# Patient Record
Sex: MALE | Race: WHITE | Employment: FULL TIME | URBAN - METROPOLITAN AREA
[De-identification: names, ages, dates, MRNs, and addresses within clinical notes are randomized per-mention and may not be internally consistent; named-entity substitution may affect disease eponyms.]

---

## 2018-04-10 PROBLEM — M20.42 HAMMERTOES OF BOTH FEET: Status: ACTIVE | Noted: 2018-04-10

## 2018-04-10 PROBLEM — E11.69 DIABETES MELLITUS TYPE 2 IN OBESE (HCC): Status: ACTIVE | Noted: 2018-04-10

## 2018-04-10 PROBLEM — E66.9 DIABETES MELLITUS TYPE 2 IN OBESE (HCC): Status: ACTIVE | Noted: 2018-04-10

## 2018-04-10 PROBLEM — M20.41 HAMMERTOES OF BOTH FEET: Status: ACTIVE | Noted: 2018-04-10

## 2018-10-18 ENCOUNTER — OFFICE VISIT (OUTPATIENT)
Dept: PODIATRY | Age: 63
End: 2018-10-18
Payer: COMMERCIAL

## 2018-10-18 VITALS — BODY MASS INDEX: 31.15 KG/M2 | HEIGHT: 72 IN | WEIGHT: 230 LBS

## 2018-10-18 DIAGNOSIS — M20.41 HAMMERTOES OF BOTH FEET: Primary | ICD-10-CM

## 2018-10-18 DIAGNOSIS — M20.42 HAMMERTOES OF BOTH FEET: Primary | ICD-10-CM

## 2018-10-18 DIAGNOSIS — E11.9 CONTROLLED TYPE 2 DIABETES MELLITUS WITHOUT COMPLICATION, WITHOUT LONG-TERM CURRENT USE OF INSULIN (HCC): ICD-10-CM

## 2018-10-18 PROCEDURE — 99203 OFFICE O/P NEW LOW 30 MIN: CPT | Performed by: PODIATRIST

## 2018-10-18 ASSESSMENT — ENCOUNTER SYMPTOMS
NAUSEA: 0
DIARRHEA: 0
COLOR CHANGE: 0
VOMITING: 0
CONSTIPATION: 0

## 2019-01-10 ENCOUNTER — OFFICE VISIT (OUTPATIENT)
Dept: PRIMARY CARE CLINIC | Age: 64
End: 2019-01-10
Payer: COMMERCIAL

## 2019-01-10 VITALS
OXYGEN SATURATION: 98 % | WEIGHT: 236.4 LBS | BODY MASS INDEX: 32.02 KG/M2 | DIASTOLIC BLOOD PRESSURE: 80 MMHG | HEART RATE: 63 BPM | SYSTOLIC BLOOD PRESSURE: 136 MMHG | HEIGHT: 72 IN

## 2019-01-10 DIAGNOSIS — E11.69 DIABETES MELLITUS TYPE 2 IN OBESE (HCC): Primary | ICD-10-CM

## 2019-01-10 DIAGNOSIS — E11.9 TYPE 2 DIABETES MELLITUS WITHOUT COMPLICATION, WITHOUT LONG-TERM CURRENT USE OF INSULIN (HCC): ICD-10-CM

## 2019-01-10 DIAGNOSIS — E66.9 DIABETES MELLITUS TYPE 2 IN OBESE (HCC): Primary | ICD-10-CM

## 2019-01-10 DIAGNOSIS — E78.00 PURE HYPERCHOLESTEROLEMIA: ICD-10-CM

## 2019-01-10 DIAGNOSIS — I10 ESSENTIAL (PRIMARY) HYPERTENSION: ICD-10-CM

## 2019-01-10 DIAGNOSIS — I10 ESSENTIAL HYPERTENSION: ICD-10-CM

## 2019-01-10 DIAGNOSIS — R09.81 SINUS CONGESTION: ICD-10-CM

## 2019-01-10 LAB — HBA1C MFR BLD: 8.4 %

## 2019-01-10 PROCEDURE — 99213 OFFICE O/P EST LOW 20 MIN: CPT | Performed by: FAMILY MEDICINE

## 2019-01-10 PROCEDURE — 83036 HEMOGLOBIN GLYCOSYLATED A1C: CPT | Performed by: FAMILY MEDICINE

## 2019-01-10 RX ORDER — ENALAPRIL MALEATE 10 MG/1
10 TABLET ORAL DAILY
Qty: 90 TABLET | Refills: 3 | Status: SHIPPED | OUTPATIENT
Start: 2019-01-10 | End: 2019-04-10

## 2019-01-10 RX ORDER — GLIMEPIRIDE 4 MG/1
4 TABLET ORAL 2 TIMES DAILY
Qty: 180 TABLET | Refills: 3 | Status: SHIPPED | OUTPATIENT
Start: 2019-01-10 | End: 2019-04-10

## 2019-01-10 RX ORDER — PRAVASTATIN SODIUM 40 MG
40 TABLET ORAL DAILY
Qty: 90 TABLET | Refills: 3 | Status: SHIPPED | OUTPATIENT
Start: 2019-01-10 | End: 2019-04-10

## 2019-01-10 RX ORDER — METOPROLOL SUCCINATE 25 MG/1
25 TABLET, EXTENDED RELEASE ORAL DAILY
Qty: 90 TABLET | Refills: 3 | Status: SHIPPED | OUTPATIENT
Start: 2019-01-10 | End: 2019-04-10

## 2019-01-10 RX ORDER — FENOFIBRATE 134 MG/1
134 CAPSULE ORAL
Qty: 90 CAPSULE | Refills: 3 | Status: SHIPPED | OUTPATIENT
Start: 2019-01-10 | End: 2019-04-10

## 2019-01-10 ASSESSMENT — ENCOUNTER SYMPTOMS
VOMITING: 0
WHEEZING: 0
RHINORRHEA: 0
NAUSEA: 0
COUGH: 0
SHORTNESS OF BREATH: 0
SORE THROAT: 0
ABDOMINAL PAIN: 0
DIARRHEA: 0
EYE DISCHARGE: 0
EYE REDNESS: 0

## 2019-01-10 ASSESSMENT — PATIENT HEALTH QUESTIONNAIRE - PHQ9
SUM OF ALL RESPONSES TO PHQ QUESTIONS 1-9: 0
SUM OF ALL RESPONSES TO PHQ9 QUESTIONS 1 & 2: 0
1. LITTLE INTEREST OR PLEASURE IN DOING THINGS: 0
SUM OF ALL RESPONSES TO PHQ QUESTIONS 1-9: 0
2. FEELING DOWN, DEPRESSED OR HOPELESS: 0

## 2019-04-10 ENCOUNTER — OFFICE VISIT (OUTPATIENT)
Dept: PRIMARY CARE CLINIC | Age: 64
End: 2019-04-10
Payer: COMMERCIAL

## 2019-04-10 VITALS
OXYGEN SATURATION: 97 % | WEIGHT: 231.4 LBS | DIASTOLIC BLOOD PRESSURE: 70 MMHG | HEIGHT: 72 IN | SYSTOLIC BLOOD PRESSURE: 132 MMHG | HEART RATE: 63 BPM | BODY MASS INDEX: 31.34 KG/M2

## 2019-04-10 DIAGNOSIS — E11.9 TYPE 2 DIABETES MELLITUS WITHOUT COMPLICATION, WITHOUT LONG-TERM CURRENT USE OF INSULIN (HCC): ICD-10-CM

## 2019-04-10 DIAGNOSIS — I10 ESSENTIAL HYPERTENSION: ICD-10-CM

## 2019-04-10 DIAGNOSIS — E11.69 DIABETES MELLITUS TYPE 2 IN OBESE (HCC): Primary | ICD-10-CM

## 2019-04-10 DIAGNOSIS — I10 ESSENTIAL (PRIMARY) HYPERTENSION: ICD-10-CM

## 2019-04-10 DIAGNOSIS — R09.81 SINUS CONGESTION: ICD-10-CM

## 2019-04-10 DIAGNOSIS — E66.9 DIABETES MELLITUS TYPE 2 IN OBESE (HCC): Primary | ICD-10-CM

## 2019-04-10 LAB — HBA1C MFR BLD: 7.2 %

## 2019-04-10 PROCEDURE — 99213 OFFICE O/P EST LOW 20 MIN: CPT | Performed by: FAMILY MEDICINE

## 2019-04-10 PROCEDURE — 83036 HEMOGLOBIN GLYCOSYLATED A1C: CPT | Performed by: FAMILY MEDICINE

## 2019-04-10 RX ORDER — METOPROLOL SUCCINATE 25 MG/1
25 TABLET, EXTENDED RELEASE ORAL DAILY
Qty: 90 TABLET | Refills: 3 | Status: SHIPPED | OUTPATIENT
Start: 2019-04-10 | End: 2019-07-15

## 2019-04-10 RX ORDER — FENOFIBRATE 134 MG/1
134 CAPSULE ORAL
Qty: 90 CAPSULE | Refills: 3 | Status: SHIPPED | OUTPATIENT
Start: 2019-04-10 | End: 2019-07-15

## 2019-04-10 RX ORDER — GLIMEPIRIDE 4 MG/1
4 TABLET ORAL 2 TIMES DAILY
Qty: 180 TABLET | Refills: 3 | Status: SHIPPED | OUTPATIENT
Start: 2019-04-10 | End: 2019-05-20 | Stop reason: SDUPTHER

## 2019-04-10 RX ORDER — ENALAPRIL MALEATE 10 MG/1
10 TABLET ORAL DAILY
Qty: 90 TABLET | Refills: 3 | Status: SHIPPED | OUTPATIENT
Start: 2019-04-10 | End: 2019-07-15

## 2019-04-10 RX ORDER — PRAVASTATIN SODIUM 40 MG
40 TABLET ORAL DAILY
Qty: 90 TABLET | Refills: 3 | Status: SHIPPED | OUTPATIENT
Start: 2019-04-10 | End: 2019-07-15

## 2019-04-10 ASSESSMENT — ENCOUNTER SYMPTOMS
ABDOMINAL PAIN: 0
EYE DISCHARGE: 0
SORE THROAT: 0
RHINORRHEA: 0
SHORTNESS OF BREATH: 0
DIARRHEA: 0
COUGH: 0
WHEEZING: 0
NAUSEA: 0
VOMITING: 0
EYE REDNESS: 0

## 2019-04-10 NOTE — PROGRESS NOTES
[address] [place] [address] PRIMARY CARE  [address] [place] [address]  Dept: [phone]    Sunni Driscoll is a 58 y.o. male who presents today for his medical conditions/complaintsas noted below. Chief Complaint   Patient presents with    3 Month Follow-Up     DM check       HPI:     HPI   Pt presents today for a 3 month follow-up for his DM. The pt states he doesn't check his sugars often at home but he says when he does they range from 110-130. Pt states he does not check his blood pressure often at home either. LDL Calculated (mg/dL)   Date Value   10/05/2018 99   04/03/2017 109   01/07/2016 103       (goal LDL is <100)   AST (U/L)   Date Value   01/07/2016 44     ALT (U/L)   Date Value   01/07/2016 50     BP Readings from Last 3 Encounters:   04/10/19 132/70   01/10/19 136/80   10/09/18 126/70          (goal 120/80)    No past medical history on file. Past Surgical History:   Procedure Laterality Date    JOINT REPLACEMENT      SHOULDER SURGERY Right        Family History   Problem Relation Age of Onset    Dementia Mother     Diabetes Mother     Lung Cancer Brother        Social History     Tobacco Use    Smoking status: Never Smoker    Smokeless tobacco: Never Used   Substance Use Topics    Alcohol use:  Yes     Alcohol/week: 0.0 oz     Comment: very rarely      Current Outpatient Medications   Medication Sig Dispense Refill    glimepiride (AMARYL) 4 MG tablet Take 1 tablet by mouth 2 times daily 180 tablet 3    fenofibrate micronized (LOFIBRA) 134 MG capsule Take 1 capsule by mouth every morning (before breakfast) 90 capsule 3    empagliflozin (JARDIANCE) 10 MG tablet Take 1 tablet by mouth daily 90 tablet 3    metFORMIN (GLUCOPHAGE) 1000 MG tablet Take 1 tablet by mouth 2 times daily (with meals) 180 tablet 3    metoprolol succinate (TOPROL XL) 25 MG extended release tablet Take 1 tablet by mouth daily 90 tablet 3    pravastatin (PRAVACHOL) 40 MG tablet Take 1 tablet by mouth daily 90 tablet 3    SITagliptin (JANUVIA) 50 MG tablet Take 0.5 tablets by mouth daily 90 tablet 3    enalapril (VASOTEC) 10 MG tablet Take 1 tablet by mouth daily 90 tablet 3    aspirin EC 81 MG EC tablet Take 1 tablet by mouth daily 30 tablet 3     No current facility-administered medications for this visit. No Known Allergies    Health Maintenance   Topic Date Due    Diabetic retinal exam  08/14/1964    HIV screen  08/14/1969    Colon cancer screen colonoscopy  08/14/2004    Shingles Vaccine (2 of 3) 06/01/2017    Lipid screen  10/05/2019    Potassium monitoring  10/05/2019    Creatinine monitoring  10/05/2019    Diabetic microalbuminuria test  10/09/2019    Diabetic foot exam  10/18/2019    A1C test (Diabetic or Prediabetic)  01/10/2020    DTaP/Tdap/Td vaccine (2 - Td) 01/01/2022    Flu vaccine  Completed    Pneumococcal 0-64 years Vaccine  Completed    Hepatitis C screen  Completed       Subjective:      Review of Systems   Constitutional: Negative for chills and fever. HENT: Negative for rhinorrhea and sore throat. Eyes: Negative for discharge and redness. Respiratory: Negative for cough, shortness of breath and wheezing. Cardiovascular: Negative for chest pain and palpitations. Gastrointestinal: Negative for abdominal pain, diarrhea, nausea and vomiting. Genitourinary: Negative for dysuria and frequency. Musculoskeletal: Negative for arthralgias and myalgias. Neurological: Negative for dizziness, light-headedness and headaches. Psychiatric/Behavioral: Negative for sleep disturbance. Objective:     /70   Pulse 63   Ht 6' (1.829 m)   Wt 231 lb 6.4 oz (105 kg)   SpO2 97%   BMI 31.38 kg/m²   Physical Exam   Constitutional: He is oriented to person, place, and time. He appears well-developed and well-nourished. No distress. HENT:   Head: Normocephalic and atraumatic.    Mouth/Throat: Oropharynx is clear and moist.   Eyes: Pupils are equal, round, and reactive to light. Conjunctivae are normal. Right eye exhibits no discharge. Left eye exhibits no discharge. No scleral icterus. Neck: No tracheal deviation present. No thyromegaly present. Cardiovascular: Normal rate, regular rhythm and normal heart sounds. No carotid bruits   Pulmonary/Chest: Effort normal and breath sounds normal. No respiratory distress. He has no wheezes. Abdominal: He exhibits no distension. There is no tenderness. Musculoskeletal: He exhibits no edema. Lymphadenopathy:     He has no cervical adenopathy. Neurological: He is alert and oriented to person, place, and time. Skin: Skin is warm. No rash noted. Psychiatric: He has a normal mood and affect. His behavior is normal. Thought content normal.   Nursing note and vitals reviewed. Assessment:       Diagnosis Orders   1. Diabetes mellitus type 2 in obese ([facility])  MT COLLECTION CAPILLARY BLOOD SPECIMEN    POCT glycosylated hemoglobin (Hb A1C)    Basic Metabolic Panel, Fasting    empagliflozin (JARDIANCE) 10 MG tablet   2. Essential hypertension  Basic Metabolic Panel, Fasting   3. Type 2 diabetes mellitus without complication, without long-term current use of insulin ([facility])  glimepiride (AMARYL) 4 MG tablet    metFORMIN (GLUCOPHAGE) 1000 MG tablet    pravastatin (PRAVACHOL) 40 MG tablet    SITagliptin (JANUVIA) 50 MG tablet    enalapril (VASOTEC) 10 MG tablet   4. Sinus congestion  fenofibrate micronized (LOFIBRA) 134 MG capsule   5. Essential (primary) hypertension  metoprolol succinate (TOPROL XL) 25 MG extended release tablet        Plan:    labs ordered  Increase Januvia to 50mg daily  Renew meds  Colonoscopy with Dr Aleida Willard once on Medicare  Pt to get eye exam done     Return in about 3 months (around 7/10/2019).     Orders Placed This Encounter   Procedures    Basic Metabolic Panel, Fasting     Standing Status:   Future     Standing Expiration Date:   4/10/2020    POCT glycosylated hemoglobin (Hb A1C)    [place] COLLECTION CAPILLARY BLOOD SPECIMEN     Orders Placed This Encounter   Medications    glimepiride (AMARYL) 4 MG tablet     Sig: Take 1 tablet by mouth 2 times daily     Dispense:  180 tablet     Refill:  3    fenofibrate micronized (LOFIBRA) 134 MG capsule     Sig: Take 1 capsule by mouth every morning (before breakfast)     Dispense:  90 capsule     Refill:  3    empagliflozin (JARDIANCE) 10 MG tablet     Sig: Take 1 tablet by mouth daily     Dispense:  90 tablet     Refill:  3    metFORMIN (GLUCOPHAGE) 1000 MG tablet     Sig: Take 1 tablet by mouth 2 times daily (with meals)     Dispense:  180 tablet     Refill:  3    metoprolol succinate (TOPROL XL) 25 MG extended release tablet     Sig: Take 1 tablet by mouth daily     Dispense:  90 tablet     Refill:  3    pravastatin (PRAVACHOL) 40 MG tablet     Sig: Take 1 tablet by mouth daily     Dispense:  90 tablet     Refill:  3    SITagliptin (JANUVIA) 50 MG tablet     Sig: Take 0.5 tablets by mouth daily     Dispense:  90 tablet     Refill:  3    enalapril (VASOTEC) 10 MG tablet     Sig: Take 1 tablet by mouth daily     Dispense:  90 tablet     Refill:  3       Patient given educationalmaterials - see patient instructions. Discussed use, benefit, and side effectsof prescribed medications. All patient questions answered. Pt voiced understanding. Reviewed health maintenance. Instructed to continue current medications, diet andexercise. Patient agreed with treatment plan. Follow up as directed.      Electronicallysigned by Saundra Montes MD on 4/10/2019 at 9:23 AM

## 2019-05-20 ENCOUNTER — HOSPITAL ENCOUNTER (OUTPATIENT)
Age: 64
Setting detail: SPECIMEN
Discharge: HOME | End: 2019-05-20
Payer: COMMERCIAL

## 2019-05-20 DIAGNOSIS — E11.69 DIABETES MELLITUS TYPE 2 IN OBESE (HCC): ICD-10-CM

## 2019-05-20 DIAGNOSIS — E66.9 DIABETES MELLITUS TYPE 2 IN OBESE (HCC): ICD-10-CM

## 2019-05-20 DIAGNOSIS — I10 ESSENTIAL HYPERTENSION: ICD-10-CM

## 2019-05-20 DIAGNOSIS — E11.9 TYPE 2 DIABETES MELLITUS WITHOUT COMPLICATION, WITHOUT LONG-TERM CURRENT USE OF INSULIN (HCC): ICD-10-CM

## 2019-05-20 LAB
ANION GAP SERPL CALCULATED.3IONS-SCNC: 14 MMOL/L (ref 9–17)
BUN BLDV-MCNC: 29 MG/DL (ref 8–23)
BUN/CREAT BLD: ABNORMAL (ref 9–20)
CALCIUM SERPL-MCNC: 9.8 MG/DL (ref 8.6–10.4)
CHLORIDE BLD-SCNC: 102 MMOL/L (ref 98–107)
CO2: 22 MMOL/L (ref 20–31)
CREAT SERPL-MCNC: 1.16 MG/DL (ref 0.7–1.2)
GFR AFRICAN AMERICAN: >60 ML/MIN
GFR NON-AFRICAN AMERICAN: >60 ML/MIN
GFR SERPL CREATININE-BSD FRML MDRD: ABNORMAL ML/MIN/{1.73_M2}
GFR SERPL CREATININE-BSD FRML MDRD: ABNORMAL ML/MIN/{1.73_M2}
GLUCOSE FASTING: 96 MG/DL (ref 70–99)
POTASSIUM SERPL-SCNC: 4.4 MMOL/L (ref 3.7–5.3)
SODIUM BLD-SCNC: 138 MMOL/L (ref 135–144)

## 2019-05-20 RX ORDER — GLIMEPIRIDE 4 MG/1
TABLET ORAL
Qty: 30 TABLET | Refills: 11 | Status: SHIPPED | OUTPATIENT
Start: 2019-05-20 | End: 2019-07-15

## 2019-07-15 ENCOUNTER — OFFICE VISIT (OUTPATIENT)
Dept: PRIMARY CARE CLINIC | Age: 64
End: 2019-07-15
Payer: COMMERCIAL

## 2019-07-15 VITALS
BODY MASS INDEX: 31.72 KG/M2 | HEIGHT: 72 IN | DIASTOLIC BLOOD PRESSURE: 76 MMHG | SYSTOLIC BLOOD PRESSURE: 136 MMHG | OXYGEN SATURATION: 97 % | WEIGHT: 234.2 LBS | HEART RATE: 59 BPM

## 2019-07-15 DIAGNOSIS — E11.69 DIABETES MELLITUS TYPE 2 IN OBESE (HCC): ICD-10-CM

## 2019-07-15 DIAGNOSIS — E66.9 DIABETES MELLITUS TYPE 2 IN OBESE (HCC): ICD-10-CM

## 2019-07-15 DIAGNOSIS — E11.9 TYPE 2 DIABETES MELLITUS WITHOUT COMPLICATION, WITHOUT LONG-TERM CURRENT USE OF INSULIN (HCC): ICD-10-CM

## 2019-07-15 DIAGNOSIS — R09.81 SINUS CONGESTION: ICD-10-CM

## 2019-07-15 DIAGNOSIS — I10 ESSENTIAL (PRIMARY) HYPERTENSION: ICD-10-CM

## 2019-07-15 PROCEDURE — 99213 OFFICE O/P EST LOW 20 MIN: CPT | Performed by: FAMILY MEDICINE

## 2019-07-15 RX ORDER — FENOFIBRATE 134 MG/1
134 CAPSULE ORAL
Qty: 90 CAPSULE | Refills: 3 | Status: SHIPPED | OUTPATIENT
Start: 2019-07-15 | End: 2019-07-22 | Stop reason: SDUPTHER

## 2019-07-15 RX ORDER — GLIMEPIRIDE 4 MG/1
TABLET ORAL
Qty: 90 TABLET | Refills: 3 | Status: SHIPPED | OUTPATIENT
Start: 2019-07-15 | End: 2019-07-22 | Stop reason: SDUPTHER

## 2019-07-15 RX ORDER — PRAVASTATIN SODIUM 40 MG
40 TABLET ORAL DAILY
Qty: 90 TABLET | Refills: 3 | Status: SHIPPED | OUTPATIENT
Start: 2019-07-15 | End: 2019-07-22 | Stop reason: SDUPTHER

## 2019-07-15 RX ORDER — METOPROLOL SUCCINATE 25 MG/1
25 TABLET, EXTENDED RELEASE ORAL DAILY
Qty: 90 TABLET | Refills: 3 | Status: SHIPPED | OUTPATIENT
Start: 2019-07-15 | End: 2019-07-22 | Stop reason: SDUPTHER

## 2019-07-15 RX ORDER — ENALAPRIL MALEATE 10 MG/1
10 TABLET ORAL DAILY
Qty: 90 TABLET | Refills: 3 | Status: SHIPPED | OUTPATIENT
Start: 2019-07-15 | End: 2019-07-22 | Stop reason: SDUPTHER

## 2019-07-15 ASSESSMENT — ENCOUNTER SYMPTOMS
EYE REDNESS: 0
WHEEZING: 0
DIARRHEA: 0
SORE THROAT: 0
EYE DISCHARGE: 0
ABDOMINAL PAIN: 0
NAUSEA: 0
RHINORRHEA: 0
SHORTNESS OF BREATH: 0
VOMITING: 0
COUGH: 0

## 2019-07-22 DIAGNOSIS — E11.69 DIABETES MELLITUS TYPE 2 IN OBESE (HCC): ICD-10-CM

## 2019-07-22 DIAGNOSIS — E66.9 DIABETES MELLITUS TYPE 2 IN OBESE (HCC): ICD-10-CM

## 2019-07-22 DIAGNOSIS — E11.9 TYPE 2 DIABETES MELLITUS WITHOUT COMPLICATION, WITHOUT LONG-TERM CURRENT USE OF INSULIN (HCC): ICD-10-CM

## 2019-07-22 DIAGNOSIS — R09.81 SINUS CONGESTION: ICD-10-CM

## 2019-07-22 DIAGNOSIS — I10 ESSENTIAL (PRIMARY) HYPERTENSION: ICD-10-CM

## 2019-07-22 RX ORDER — ENALAPRIL MALEATE 10 MG/1
10 TABLET ORAL DAILY
Qty: 14 TABLET | Refills: 0 | Status: SHIPPED | OUTPATIENT
Start: 2019-07-22 | End: 2019-07-22 | Stop reason: SDUPTHER

## 2019-07-22 RX ORDER — FENOFIBRATE 134 MG/1
134 CAPSULE ORAL
Qty: 90 CAPSULE | Refills: 3 | Status: SHIPPED | OUTPATIENT
Start: 2019-07-22 | End: 2019-10-09 | Stop reason: SDUPTHER

## 2019-07-22 RX ORDER — FENOFIBRATE 134 MG/1
134 CAPSULE ORAL
Qty: 14 CAPSULE | Refills: 0 | Status: SHIPPED | OUTPATIENT
Start: 2019-07-22 | End: 2019-07-22 | Stop reason: SDUPTHER

## 2019-07-22 RX ORDER — GLIMEPIRIDE 4 MG/1
TABLET ORAL
Qty: 90 TABLET | Refills: 3 | Status: SHIPPED | OUTPATIENT
Start: 2019-07-22 | End: 2019-10-09 | Stop reason: SDUPTHER

## 2019-07-22 RX ORDER — METOPROLOL SUCCINATE 25 MG/1
25 TABLET, EXTENDED RELEASE ORAL DAILY
Qty: 90 TABLET | Refills: 3 | Status: SHIPPED | OUTPATIENT
Start: 2019-07-22 | End: 2019-10-09 | Stop reason: SDUPTHER

## 2019-07-22 RX ORDER — METOPROLOL SUCCINATE 25 MG/1
25 TABLET, EXTENDED RELEASE ORAL DAILY
Qty: 14 TABLET | Refills: 0 | Status: SHIPPED | OUTPATIENT
Start: 2019-07-22 | End: 2019-07-22 | Stop reason: SDUPTHER

## 2019-07-22 RX ORDER — PRAVASTATIN SODIUM 40 MG
40 TABLET ORAL DAILY
Qty: 90 TABLET | Refills: 3 | Status: SHIPPED | OUTPATIENT
Start: 2019-07-22 | End: 2019-10-09 | Stop reason: SDUPTHER

## 2019-07-22 RX ORDER — PRAVASTATIN SODIUM 40 MG
40 TABLET ORAL DAILY
Qty: 14 TABLET | Refills: 0 | Status: SHIPPED | OUTPATIENT
Start: 2019-07-22 | End: 2019-07-22 | Stop reason: SDUPTHER

## 2019-07-22 RX ORDER — ENALAPRIL MALEATE 10 MG/1
10 TABLET ORAL DAILY
Qty: 90 TABLET | Refills: 3 | Status: SHIPPED | OUTPATIENT
Start: 2019-07-22 | End: 2019-10-09 | Stop reason: SDUPTHER

## 2019-07-22 RX ORDER — GLIMEPIRIDE 4 MG/1
TABLET ORAL
Qty: 14 TABLET | Refills: 0 | Status: SHIPPED | OUTPATIENT
Start: 2019-07-22 | End: 2019-07-22 | Stop reason: SDUPTHER

## 2019-10-09 ENCOUNTER — HOSPITAL ENCOUNTER (OUTPATIENT)
Age: 64
Setting detail: SPECIMEN
Discharge: HOME | End: 2019-10-09
Payer: MEDICARE

## 2019-10-09 ENCOUNTER — OFFICE VISIT (OUTPATIENT)
Dept: PRIMARY CARE CLINIC | Age: 64
End: 2019-10-09
Payer: MEDICARE

## 2019-10-09 VITALS
HEART RATE: 57 BPM | HEIGHT: 72 IN | OXYGEN SATURATION: 97 % | BODY MASS INDEX: 30.83 KG/M2 | DIASTOLIC BLOOD PRESSURE: 74 MMHG | WEIGHT: 227.6 LBS | SYSTOLIC BLOOD PRESSURE: 128 MMHG

## 2019-10-09 DIAGNOSIS — Z12.11 ENCOUNTER FOR SCREENING FOR MALIGNANT NEOPLASM OF COLON: ICD-10-CM

## 2019-10-09 DIAGNOSIS — Z00.00 ANNUAL PHYSICAL EXAM: ICD-10-CM

## 2019-10-09 DIAGNOSIS — Z23 NEED FOR VIRAL IMMUNIZATION: ICD-10-CM

## 2019-10-09 DIAGNOSIS — E66.9 DIABETES MELLITUS TYPE 2 IN OBESE (HCC): ICD-10-CM

## 2019-10-09 DIAGNOSIS — Z13.6 ENCOUNTER FOR LIPID SCREENING FOR CARDIOVASCULAR DISEASE: ICD-10-CM

## 2019-10-09 DIAGNOSIS — E11.69 DIABETES MELLITUS TYPE 2 IN OBESE (HCC): ICD-10-CM

## 2019-10-09 DIAGNOSIS — Z00.00 ROUTINE GENERAL MEDICAL EXAMINATION AT A HEALTH CARE FACILITY: Primary | ICD-10-CM

## 2019-10-09 DIAGNOSIS — Z12.5 SCREENING PSA (PROSTATE SPECIFIC ANTIGEN): ICD-10-CM

## 2019-10-09 DIAGNOSIS — R09.81 SINUS CONGESTION: ICD-10-CM

## 2019-10-09 DIAGNOSIS — Z13.220 ENCOUNTER FOR LIPID SCREENING FOR CARDIOVASCULAR DISEASE: ICD-10-CM

## 2019-10-09 DIAGNOSIS — I10 ESSENTIAL (PRIMARY) HYPERTENSION: ICD-10-CM

## 2019-10-09 DIAGNOSIS — E11.9 TYPE 2 DIABETES MELLITUS WITHOUT COMPLICATION, WITHOUT LONG-TERM CURRENT USE OF INSULIN (HCC): ICD-10-CM

## 2019-10-09 LAB
ANION GAP SERPL CALCULATED.3IONS-SCNC: 15 MMOL/L (ref 9–17)
BUN BLDV-MCNC: 27 MG/DL (ref 8–23)
BUN/CREAT BLD: ABNORMAL (ref 9–20)
CALCIUM SERPL-MCNC: 9.9 MG/DL (ref 8.6–10.4)
CHLORIDE BLD-SCNC: 107 MMOL/L (ref 98–107)
CHOLESTEROL, FASTING: 192 MG/DL
CHOLESTEROL/HDL RATIO: 5.1
CO2: 23 MMOL/L (ref 20–31)
CREAT SERPL-MCNC: 1.21 MG/DL (ref 0.7–1.2)
CREATININE URINE POCT: NORMAL
GFR AFRICAN AMERICAN: >60 ML/MIN
GFR NON-AFRICAN AMERICAN: >60 ML/MIN
GFR SERPL CREATININE-BSD FRML MDRD: ABNORMAL ML/MIN/{1.73_M2}
GFR SERPL CREATININE-BSD FRML MDRD: ABNORMAL ML/MIN/{1.73_M2}
GLUCOSE FASTING: 130 MG/DL (ref 70–99)
HBA1C MFR BLD: 7.7 %
HDLC SERPL-MCNC: 38 MG/DL
LDL CHOLESTEROL: 112 MG/DL (ref 0–130)
MICROALBUMIN/CREAT 24H UR: NORMAL MG/G{CREAT}
MICROALBUMIN/CREAT UR-RTO: NORMAL
POTASSIUM SERPL-SCNC: 4.4 MMOL/L (ref 3.7–5.3)
PROSTATE SPECIFIC ANTIGEN: 1.79 UG/L
SODIUM BLD-SCNC: 145 MMOL/L (ref 135–144)
TRIGLYCERIDE, FASTING: 212 MG/DL
VLDLC SERPL CALC-MCNC: ABNORMAL MG/DL (ref 1–30)

## 2019-10-09 PROCEDURE — 90670 PCV13 VACCINE IM: CPT | Performed by: FAMILY MEDICINE

## 2019-10-09 PROCEDURE — 90662 IIV NO PRSV INCREASED AG IM: CPT | Performed by: FAMILY MEDICINE

## 2019-10-09 PROCEDURE — 83036 HEMOGLOBIN GLYCOSYLATED A1C: CPT | Performed by: FAMILY MEDICINE

## 2019-10-09 PROCEDURE — 82044 UR ALBUMIN SEMIQUANTITATIVE: CPT | Performed by: FAMILY MEDICINE

## 2019-10-09 PROCEDURE — G0403 EKG FOR INITIAL PREVENT EXAM: HCPCS | Performed by: FAMILY MEDICINE

## 2019-10-09 PROCEDURE — 3017F COLORECTAL CA SCREEN DOC REV: CPT | Performed by: FAMILY MEDICINE

## 2019-10-09 PROCEDURE — 1123F ACP DISCUSS/DSCN MKR DOCD: CPT | Performed by: FAMILY MEDICINE

## 2019-10-09 PROCEDURE — G0008 ADMIN INFLUENZA VIRUS VAC: HCPCS | Performed by: FAMILY MEDICINE

## 2019-10-09 PROCEDURE — G0402 INITIAL PREVENTIVE EXAM: HCPCS | Performed by: FAMILY MEDICINE

## 2019-10-09 PROCEDURE — G0009 ADMIN PNEUMOCOCCAL VACCINE: HCPCS | Performed by: FAMILY MEDICINE

## 2019-10-09 PROCEDURE — 4040F PNEUMOC VAC/ADMIN/RCVD: CPT | Performed by: FAMILY MEDICINE

## 2019-10-09 RX ORDER — ENALAPRIL MALEATE 10 MG/1
10 TABLET ORAL DAILY
Qty: 90 TABLET | Refills: 3 | Status: SHIPPED | OUTPATIENT
Start: 2019-10-09 | End: 2019-10-11 | Stop reason: SDUPTHER

## 2019-10-09 RX ORDER — PRAVASTATIN SODIUM 40 MG
40 TABLET ORAL DAILY
Qty: 90 TABLET | Refills: 3 | Status: SHIPPED | OUTPATIENT
Start: 2019-10-09 | End: 2019-10-11 | Stop reason: SDUPTHER

## 2019-10-09 RX ORDER — FENOFIBRATE 134 MG/1
134 CAPSULE ORAL
Qty: 90 CAPSULE | Refills: 3 | Status: SHIPPED | OUTPATIENT
Start: 2019-10-09 | End: 2019-10-11 | Stop reason: SDUPTHER

## 2019-10-09 RX ORDER — TADALAFIL 20 MG/1
20 TABLET ORAL DAILY PRN
Qty: 6 TABLET | Refills: 5 | Status: SHIPPED | OUTPATIENT
Start: 2019-10-09 | End: 2020-10-21

## 2019-10-09 RX ORDER — GLIMEPIRIDE 4 MG/1
TABLET ORAL
Qty: 90 TABLET | Refills: 3 | Status: SHIPPED | OUTPATIENT
Start: 2019-10-09 | End: 2019-10-11 | Stop reason: SDUPTHER

## 2019-10-09 RX ORDER — METOPROLOL SUCCINATE 25 MG/1
25 TABLET, EXTENDED RELEASE ORAL DAILY
Qty: 90 TABLET | Refills: 3 | Status: SHIPPED | OUTPATIENT
Start: 2019-10-09 | End: 2019-10-11 | Stop reason: SDUPTHER

## 2019-10-09 ASSESSMENT — PATIENT HEALTH QUESTIONNAIRE - PHQ9
SUM OF ALL RESPONSES TO PHQ QUESTIONS 1-9: 0
SUM OF ALL RESPONSES TO PHQ QUESTIONS 1-9: 0

## 2019-10-11 ENCOUNTER — TELEPHONE (OUTPATIENT)
Dept: PRIMARY CARE CLINIC | Age: 64
End: 2019-10-11

## 2019-10-11 DIAGNOSIS — I10 ESSENTIAL (PRIMARY) HYPERTENSION: ICD-10-CM

## 2019-10-11 DIAGNOSIS — E11.9 TYPE 2 DIABETES MELLITUS WITHOUT COMPLICATION, WITHOUT LONG-TERM CURRENT USE OF INSULIN (HCC): ICD-10-CM

## 2019-10-11 DIAGNOSIS — E11.69 DIABETES MELLITUS TYPE 2 IN OBESE (HCC): ICD-10-CM

## 2019-10-11 DIAGNOSIS — E66.9 DIABETES MELLITUS TYPE 2 IN OBESE (HCC): ICD-10-CM

## 2019-10-11 DIAGNOSIS — R09.81 SINUS CONGESTION: ICD-10-CM

## 2019-10-11 RX ORDER — PRAVASTATIN SODIUM 40 MG
40 TABLET ORAL DAILY
Qty: 90 TABLET | Refills: 3 | Status: SHIPPED | OUTPATIENT
Start: 2019-10-11 | End: 2020-10-21

## 2019-10-11 RX ORDER — GLIMEPIRIDE 4 MG/1
TABLET ORAL
Qty: 90 TABLET | Refills: 3 | Status: SHIPPED | OUTPATIENT
Start: 2019-10-11 | End: 2020-10-21

## 2019-10-11 RX ORDER — ENALAPRIL MALEATE 10 MG/1
10 TABLET ORAL DAILY
Qty: 90 TABLET | Refills: 3 | Status: SHIPPED | OUTPATIENT
Start: 2019-10-11 | End: 2020-10-21

## 2019-10-11 RX ORDER — METOPROLOL SUCCINATE 25 MG/1
25 TABLET, EXTENDED RELEASE ORAL DAILY
Qty: 90 TABLET | Refills: 3 | Status: SHIPPED | OUTPATIENT
Start: 2019-10-11 | End: 2020-10-21

## 2019-10-11 RX ORDER — FENOFIBRATE 134 MG/1
134 CAPSULE ORAL
Qty: 90 CAPSULE | Refills: 3 | Status: SHIPPED | OUTPATIENT
Start: 2019-10-11 | End: 2020-10-21

## 2019-10-15 ENCOUNTER — TELEPHONE (OUTPATIENT)
Dept: PRIMARY CARE CLINIC | Age: 64
End: 2019-10-15

## 2020-01-09 ENCOUNTER — OFFICE VISIT (OUTPATIENT)
Dept: PRIMARY CARE CLINIC | Age: 65
End: 2020-01-09
Payer: MEDICARE

## 2020-01-09 VITALS
SYSTOLIC BLOOD PRESSURE: 124 MMHG | OXYGEN SATURATION: 97 % | BODY MASS INDEX: 30.28 KG/M2 | DIASTOLIC BLOOD PRESSURE: 76 MMHG | HEIGHT: 72 IN | WEIGHT: 223.6 LBS | HEART RATE: 61 BPM

## 2020-01-09 LAB — HBA1C MFR BLD: 8.5 %

## 2020-01-09 PROCEDURE — G8427 DOCREV CUR MEDS BY ELIG CLIN: HCPCS | Performed by: FAMILY MEDICINE

## 2020-01-09 PROCEDURE — 83036 HEMOGLOBIN GLYCOSYLATED A1C: CPT | Performed by: FAMILY MEDICINE

## 2020-01-09 PROCEDURE — 99213 OFFICE O/P EST LOW 20 MIN: CPT | Performed by: FAMILY MEDICINE

## 2020-01-09 PROCEDURE — 1123F ACP DISCUSS/DSCN MKR DOCD: CPT | Performed by: FAMILY MEDICINE

## 2020-01-09 PROCEDURE — G8417 CALC BMI ABV UP PARAM F/U: HCPCS | Performed by: FAMILY MEDICINE

## 2020-01-09 PROCEDURE — 4040F PNEUMOC VAC/ADMIN/RCVD: CPT | Performed by: FAMILY MEDICINE

## 2020-01-09 PROCEDURE — 3017F COLORECTAL CA SCREEN DOC REV: CPT | Performed by: FAMILY MEDICINE

## 2020-01-09 PROCEDURE — 2022F DILAT RTA XM EVC RTNOPTHY: CPT | Performed by: FAMILY MEDICINE

## 2020-01-09 PROCEDURE — 1036F TOBACCO NON-USER: CPT | Performed by: FAMILY MEDICINE

## 2020-01-09 PROCEDURE — G8482 FLU IMMUNIZE ORDER/ADMIN: HCPCS | Performed by: FAMILY MEDICINE

## 2020-01-09 ASSESSMENT — ENCOUNTER SYMPTOMS
ABDOMINAL PAIN: 0
EYE DISCHARGE: 0
DIARRHEA: 0
COUGH: 0
RHINORRHEA: 0
WHEEZING: 0
VOMITING: 0
SHORTNESS OF BREATH: 0
NAUSEA: 0
SORE THROAT: 0
EYE REDNESS: 0

## 2020-01-09 ASSESSMENT — PATIENT HEALTH QUESTIONNAIRE - PHQ9
SUM OF ALL RESPONSES TO PHQ9 QUESTIONS 1 & 2: 0
2. FEELING DOWN, DEPRESSED OR HOPELESS: 0
SUM OF ALL RESPONSES TO PHQ QUESTIONS 1-9: 0
SUM OF ALL RESPONSES TO PHQ QUESTIONS 1-9: 0
1. LITTLE INTEREST OR PLEASURE IN DOING THINGS: 0

## 2020-01-09 NOTE — PROGRESS NOTES
tablet 3    glimepiride (AMARYL) 4 MG tablet TAKE ONE TABLET BY MOUTH ONCE DAILY 90 tablet 3    fenofibrate micronized (LOFIBRA) 134 MG capsule Take 1 capsule by mouth every morning (before breakfast) 90 capsule 3    enalapril (VASOTEC) 10 MG tablet Take 1 tablet by mouth daily 90 tablet 3    empagliflozin (JARDIANCE) 10 MG tablet Take 1 tablet by mouth daily 90 tablet 3    tadalafil (CIALIS) 20 MG tablet Take 1 tablet by mouth daily as needed for Erectile Dysfunction 6 tablet 5    aspirin EC 81 MG EC tablet Take 1 tablet by mouth daily 30 tablet 3     No current facility-administered medications for this visit. No Known Allergies    Health Maintenance   Topic Date Due    HIV screen  08/14/1969    Shingles Vaccine (2 of 3) 06/01/2017    Diabetic retinal exam  12/01/2019    Annual Wellness Visit (AWV)  10/08/2020    Diabetic foot exam  10/09/2020    A1C test (Diabetic or Prediabetic)  10/09/2020    Diabetic microalbuminuria test  10/09/2020    Lipid screen  10/09/2020    Potassium monitoring  10/09/2020    Creatinine monitoring  10/09/2020    DTaP/Tdap/Td vaccine (2 - Td) 01/01/2022    Pneumococcal 65+ years Vaccine (2 of 2 - PPSV23) 04/10/2023    Colon cancer screen colonoscopy  12/12/2029    Flu vaccine  Completed    Hepatitis C screen  Completed       Subjective:      Review of Systems   Constitutional: Negative for chills and fever. HENT: Negative for rhinorrhea and sore throat. Eyes: Negative for discharge and redness. Respiratory: Negative for cough, shortness of breath and wheezing. Cardiovascular: Negative for chest pain and palpitations. Gastrointestinal: Negative for abdominal pain, diarrhea, nausea and vomiting. Genitourinary: Negative for dysuria and frequency. Musculoskeletal: Negative for arthralgias and myalgias. Neurological: Negative for dizziness, light-headedness and headaches. Psychiatric/Behavioral: Negative for sleep disturbance.        Objective: Increase Januvia to 50 mg daily. Shingrix vaccine discussed. Return in about 4 months (around 5/9/2020). Orders Placed This Encounter   Procedures    POCT glycosylated hemoglobin (Hb A1C)    MT COLLECTION CAPILLARY BLOOD SPECIMEN     Orders Placed This Encounter   Medications    SITagliptin (JANUVIA) 50 MG tablet     Sig: Take 1 tablet by mouth daily     Dispense:  90 tablet     Refill:  3       Patient given educationalmaterials - see patient instructions. Discussed use, benefit, and side effectsof prescribed medications. All patient questions answered. Pt voiced understanding. Reviewed health maintenance. Instructed to continue current medications, diet andexercise. Patient agreed with treatment plan. Follow up as directed.      Electronicallysigned by Ana Paula Aguilar MD on 1/9/2020 at 10:25 AM

## 2020-04-07 ENCOUNTER — TELEPHONE (OUTPATIENT)
Dept: PRIMARY CARE CLINIC | Age: 65
End: 2020-04-07

## 2020-04-07 NOTE — TELEPHONE ENCOUNTER
Is he referring to the glimepiride? He has on several medications for his diabetes, so the name of the drug would be helpful. If this is what he is speaking of, it looks as though ONCE daily has been his dose since it was last ordered by Dr. Cooper Wagner in October 2019 and there was no mention of increasing that during his last visit in the office on 1/9/2020. How long has he been taking it twice daily and who told him to do so?

## 2020-04-08 NOTE — TELEPHONE ENCOUNTER
Per Rebeca Bourgeois at [facility], that is the price after running his insurance for a 90-day supply. Rebeca Bourgeois states that even if supply was lowered medication would be expensive for pt. Suggest that pt is switched to a generic brand. If there is a certain brand name medication that you would like pt to try; will have to call insurance company and see if medication is cover.

## 2020-07-14 ENCOUNTER — TELEPHONE (OUTPATIENT)
Dept: PRIMARY CARE CLINIC | Age: 65
End: 2020-07-14

## 2020-07-14 NOTE — TELEPHONE ENCOUNTER
Pt called stating he would like to speak with Dr. Ana Paula Nye on the phone prior to his appointment on 07/21/20. He states he would like to switch a few medications due to the price he has to pay when picking them up from Pharmacy. He states he does not want to talk to a nurse, that if he needs to be charged for an office visit to talk with him, he is ok with that.  Please Advise

## 2020-07-14 NOTE — TELEPHONE ENCOUNTER
Please let patient know that Dr. Mcdonnell Parent is not back until 7/20/20.   He can schedule a telephone or VV to discuss meds with another provider if he needs this done before his appt on 7/21/20

## 2020-07-14 NOTE — TELEPHONE ENCOUNTER
Patient notified- Verbalized Understanding   He states he does not need to talk to him now he just got a refill and paid the price and states that he will talk to him on his appointment

## 2020-07-21 ENCOUNTER — OFFICE VISIT (OUTPATIENT)
Dept: PRIMARY CARE CLINIC | Age: 65
End: 2020-07-21
Payer: MEDICARE

## 2020-07-21 VITALS
WEIGHT: 219.6 LBS | BODY MASS INDEX: 29.74 KG/M2 | OXYGEN SATURATION: 98 % | HEART RATE: 64 BPM | SYSTOLIC BLOOD PRESSURE: 116 MMHG | HEIGHT: 72 IN | DIASTOLIC BLOOD PRESSURE: 60 MMHG | TEMPERATURE: 97.2 F

## 2020-07-21 PROBLEM — S68.119A TRAUMATIC AMPUTATION OF FINGER OF LEFT HAND: Status: ACTIVE | Noted: 2020-06-24

## 2020-07-21 LAB — HBA1C MFR BLD: 6.8 %

## 2020-07-21 PROCEDURE — 83036 HEMOGLOBIN GLYCOSYLATED A1C: CPT | Performed by: FAMILY MEDICINE

## 2020-07-21 PROCEDURE — 1123F ACP DISCUSS/DSCN MKR DOCD: CPT | Performed by: FAMILY MEDICINE

## 2020-07-21 PROCEDURE — 3017F COLORECTAL CA SCREEN DOC REV: CPT | Performed by: FAMILY MEDICINE

## 2020-07-21 PROCEDURE — G8417 CALC BMI ABV UP PARAM F/U: HCPCS | Performed by: FAMILY MEDICINE

## 2020-07-21 PROCEDURE — 1036F TOBACCO NON-USER: CPT | Performed by: FAMILY MEDICINE

## 2020-07-21 PROCEDURE — 2022F DILAT RTA XM EVC RTNOPTHY: CPT | Performed by: FAMILY MEDICINE

## 2020-07-21 PROCEDURE — 3044F HG A1C LEVEL LT 7.0%: CPT | Performed by: FAMILY MEDICINE

## 2020-07-21 PROCEDURE — 4040F PNEUMOC VAC/ADMIN/RCVD: CPT | Performed by: FAMILY MEDICINE

## 2020-07-21 PROCEDURE — G8427 DOCREV CUR MEDS BY ELIG CLIN: HCPCS | Performed by: FAMILY MEDICINE

## 2020-07-21 PROCEDURE — 99214 OFFICE O/P EST MOD 30 MIN: CPT | Performed by: FAMILY MEDICINE

## 2020-07-21 RX ORDER — CANAGLIFLOZIN 300 MG/1
300 TABLET, FILM COATED ORAL
Qty: 90 TABLET | Refills: 1 | Status: SHIPPED | OUTPATIENT
Start: 2020-07-21 | End: 2020-10-21

## 2020-07-21 ASSESSMENT — PATIENT HEALTH QUESTIONNAIRE - PHQ9
SUM OF ALL RESPONSES TO PHQ9 QUESTIONS 1 & 2: 0
SUM OF ALL RESPONSES TO PHQ QUESTIONS 1-9: 0
SUM OF ALL RESPONSES TO PHQ QUESTIONS 1-9: 0
1. LITTLE INTEREST OR PLEASURE IN DOING THINGS: 0
2. FEELING DOWN, DEPRESSED OR HOPELESS: 0

## 2020-07-21 ASSESSMENT — ENCOUNTER SYMPTOMS
SHORTNESS OF BREATH: 0
EYE DISCHARGE: 0
SORE THROAT: 0
COUGH: 0
DIARRHEA: 0
RHINORRHEA: 0
EYE REDNESS: 0
NAUSEA: 0
ABDOMINAL PAIN: 0
VOMITING: 0
WHEEZING: 0

## 2020-07-21 NOTE — PROGRESS NOTES
[facility] PRIMARY CARE  [address]  [facility] [address]  Dept: [phone]    Leda Fothergill is a 71 y.o. male who presents today for his medical conditions/complaintsas noted below. Chief Complaint   Patient presents with    Diabetes       HPI:     HPI  Pt with traumatic amputation of left index finger by table saw in June. No pain with it. Treated at [facility]. No fever or chills. Had finished therapy. States blood sugars running in the 110's. No low blood sugar reactions. Patient denies any chest pain no shortness of breath. No melena or hematochezia. Patient states not checking blood pressures outside the office. LDL Cholesterol (mg/dL)   Date Value   10/09/2019 112     LDL Calculated (mg/dL)   Date Value   10/05/2018 99   04/03/2017 109   01/07/2016 103       (goal LDL is <100)   AST (U/L)   Date Value   01/07/2016 44     ALT (U/L)   Date Value   01/07/2016 50     BUN (mg/dL)   Date Value   10/09/2019 27 (H)     BP Readings from Last 3 Encounters:   07/21/20 116/60   01/09/20 124/76   10/09/19 128/74          (goal 120/80)    No past medical history on file. Past Surgical History:   Procedure Laterality Date    FINGER AMPUTATION Left 06/06/2020    index finger by circular saw    JOINT REPLACEMENT      SHOULDER SURGERY Right        Family History   Problem Relation Age of Onset    Dementia Mother     Diabetes Mother     Lung Cancer Brother        Social History     Tobacco Use    Smoking status: Never Smoker    Smokeless tobacco: Never Used   Substance Use Topics    Alcohol use:  Yes     Alcohol/week: 0.0 standard drinks     Comment: very rarely      Current Outpatient Medications   Medication Sig Dispense Refill    saxagliptin (ONGLYZA) 5 MG TABS tablet Take 1 tablet by mouth daily 90 tablet 1    canagliflozin (INVOKANA) 300 MG TABS tablet Take 1 tablet by mouth every morning (before breakfast) 90 tablet 1    SITagliptin (JANUVIA) 50 MG tablet Take 1 tablet by mouth daily 90 tablet 3    pravastatin (PRAVACHOL) 40 MG tablet Take 1 tablet by mouth daily 90 tablet 3    metoprolol succinate (TOPROL XL) 25 MG extended release tablet Take 1 tablet by mouth daily 90 tablet 3    metFORMIN (GLUCOPHAGE) 1000 MG tablet Take 1 tablet by mouth 2 times daily (with meals) 180 tablet 3    glimepiride (AMARYL) 4 MG tablet TAKE ONE TABLET BY MOUTH ONCE DAILY 90 tablet 3    fenofibrate micronized (LOFIBRA) 134 MG capsule Take 1 capsule by mouth every morning (before breakfast) 90 capsule 3    enalapril (VASOTEC) 10 MG tablet Take 1 tablet by mouth daily 90 tablet 3    tadalafil (CIALIS) 20 MG tablet Take 1 tablet by mouth daily as needed for Erectile Dysfunction 6 tablet 5    aspirin EC 81 MG EC tablet Take 1 tablet by mouth daily 30 tablet 3     No current facility-administered medications for this visit. No Known Allergies    Health Maintenance   Topic Date Due    HIV screen  08/14/1969    Shingles Vaccine (2 of 3) 06/01/2017    Diabetic retinal exam  12/01/2019    Flu vaccine (1) 09/01/2020    Diabetic foot exam  10/09/2020    Diabetic microalbuminuria test  10/09/2020    Lipid screen  10/09/2020    Annual Wellness Visit (AWV)  10/09/2020    Potassium monitoring  10/09/2020    Creatinine monitoring  10/09/2020    A1C test (Diabetic or Prediabetic)  01/09/2021    Pneumococcal 65+ years Vaccine (2 of 2 - PPSV23) 04/10/2023    Colon cancer screen colonoscopy  12/12/2029    DTaP/Tdap/Td vaccine (3 - Td) 06/07/2030    Hepatitis C screen  Completed    Hepatitis A vaccine  Aged Out    Hib vaccine  Aged Out    Meningococcal (ACWY) vaccine  Aged Out       Subjective:      Review of Systems   Constitutional: Negative for chills and fever. HENT: Negative for rhinorrhea and sore throat. Eyes: Negative for discharge and redness. Respiratory: Negative for cough, shortness of breath and wheezing.     Cardiovascular: Negative for chest pain and palpitations. Gastrointestinal: Negative for abdominal pain, diarrhea, nausea and vomiting. Genitourinary: Negative for dysuria and frequency. Musculoskeletal: Negative for arthralgias and myalgias. Neurological: Negative for dizziness, light-headedness and headaches. Psychiatric/Behavioral: Negative for sleep disturbance. Objective:     /60   Pulse 64   Temp 97.2 °F (36.2 °C)   Ht 6' (1.829 m)   Wt 219 lb 9.6 oz (99.6 kg)   SpO2 98%   BMI 29.78 kg/m²   Physical Exam  Vitals signs and nursing note reviewed. Constitutional:       General: He is not in acute distress. Appearance: He is well-developed. HENT:      Head: Normocephalic and atraumatic. Eyes:      General: No scleral icterus. Right eye: No discharge. Left eye: No discharge. Conjunctiva/sclera: Conjunctivae normal.      Pupils: Pupils are equal, round, and reactive to light. Neck:      Thyroid: No thyromegaly. Trachea: No tracheal deviation. Cardiovascular:      Rate and Rhythm: Normal rate and regular rhythm. Heart sounds: Normal heart sounds. Comments: No carotid bruits  Pulmonary:      Effort: Pulmonary effort is normal. No respiratory distress. Breath sounds: Normal breath sounds. No wheezing. Musculoskeletal:      Comments: Well-healed amputation of left index finger. No sign of infection. Lymphadenopathy:      Cervical: No cervical adenopathy. Skin:     General: Skin is warm. Findings: No rash. Neurological:      Mental Status: He is alert and oriented to person, place, and time. Psychiatric:         Behavior: Behavior normal.         Thought Content: Thought content normal.         Assessment:       Diagnosis Orders   1. Diabetes mellitus type 2 in obese (HCC)  [place] COLLECTION CAPILLARY BLOOD SPECIMEN    POCT glycosylated hemoglobin (Hb A1C)   2. 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  Essential hypertension          Plan: Attempt to change to Invokana at 300 mg daily and increase at 5 mg daily to save cost.  Option of going with Ozempic 0.5 mg weekly also discussed. Labs and meds reviewed. Continue other medications as is. Return in about 3 months (around 10/21/2020), or medicare wellness. Orders Placed This Encounter   Procedures    POCT glycosylated hemoglobin (Hb A1C)    [place] COLLECTION CAPILLARY BLOOD SPECIMEN     Orders Placed This Encounter   Medications    saxagliptin (ONGLYZA) 5 MG TABS tablet     Sig: Take 1 tablet by mouth daily     Dispense:  90 tablet     Refill:  1    canagliflozin (INVOKANA) 300 MG TABS tablet     Sig: Take 1 tablet by mouth every morning (before breakfast)     Dispense:  90 tablet     Refill:  1       Patient given educationalmaterials - see patient instructions. Discussed use, benefit, and side effectsof prescribed medications. All patient questions answered. Pt voiced understanding. Reviewed health maintenance. Instructed to continue current medications, diet andexercise. Patient agreed with treatment plan. Follow up as directed.      Electronicallysigned by Dalton Chaudhari MD on 7/21/2020 at 9:50 AM

## 2020-08-12 ENCOUNTER — OFFICE VISIT (OUTPATIENT)
Dept: PODIATRY | Age: 65
End: 2020-08-12
Payer: MEDICARE

## 2020-08-12 VITALS — WEIGHT: 214 LBS | BODY MASS INDEX: 28.99 KG/M2 | HEIGHT: 72 IN

## 2020-08-12 PROCEDURE — 99213 OFFICE O/P EST LOW 20 MIN: CPT | Performed by: PODIATRIST

## 2020-08-12 PROCEDURE — 4040F PNEUMOC VAC/ADMIN/RCVD: CPT | Performed by: PODIATRIST

## 2020-08-12 PROCEDURE — 1123F ACP DISCUSS/DSCN MKR DOCD: CPT | Performed by: PODIATRIST

## 2020-08-12 PROCEDURE — G8427 DOCREV CUR MEDS BY ELIG CLIN: HCPCS | Performed by: PODIATRIST

## 2020-08-12 PROCEDURE — 3017F COLORECTAL CA SCREEN DOC REV: CPT | Performed by: PODIATRIST

## 2020-08-12 PROCEDURE — 1036F TOBACCO NON-USER: CPT | Performed by: PODIATRIST

## 2020-08-12 PROCEDURE — G8417 CALC BMI ABV UP PARAM F/U: HCPCS | Performed by: PODIATRIST

## 2020-08-12 RX ORDER — IBUPROFEN AND FAMOTIDINE 800; 26.6 MG/1; MG/1
1 TABLET, COATED ORAL 3 TIMES DAILY PRN
Qty: 18 TABLET | Refills: 0 | COMMUNITY
Start: 2020-08-12 | End: 2021-04-27

## 2020-08-12 ASSESSMENT — ENCOUNTER SYMPTOMS
COLOR CHANGE: 0
DIARRHEA: 0
VOMITING: 0
NAUSEA: 0
CONSTIPATION: 0

## 2020-08-12 NOTE — PATIENT INSTRUCTIONS
placing a weighted object, such as a soup can, on the other end of the towel. 1. While sitting, place your foot on a towel on the floor and scrunch the towel toward you with your toes. 2. Then, also using your toes, push the towel away from you. [place] pickups   1. Put marbles on the floor next to a cup.  2. Using your toes, try to lift the marbles up from the floor and put them in the cup. Follow-up care is a key part of your treatment and safety. Be sure to make and go to all appointments, and call your doctor if you are having problems. It's also a good idea to know your test results and keep a list of the medicines you take. Where can you learn more? Go to [email or web address] org and sign in to your [employer] account. Enter B192 in the [employer] box to learn more about \"Plantar Fasciitis: Exercises. \"     If you do not have an account, please click on the \"Sign Up Now\" link. Current as of: March 2, 2020               Content Version: 12.5  © 7978-5314 Healthwise, Incorporated. Care instructions adapted under license by [facility] ([facility]). If you have questions about a medical condition or this instruction, always ask your healthcare professional. [address] any warranty or liability for your use of this information.

## 2020-08-12 NOTE — PROGRESS NOTES
[facility]  New Patient History and Physical    Chief Complaint:   Chief Complaint   Patient presents with    Foot Pain     B/L HEEL PAIN PAIN OFF AND ON THE PAST 10 YEARS       HPI: Vee Das is a 71 y.o. male who presents to the office today complaining of bilateral heel pain, left worse than right. Symptoms began 10 year(s) ago. Patient relates pain is Present. Pain is rated 2 out of 10 and is described as waxing and waning. Treatments prior to today's visit include: has custom orthotics, gets new ones every year, at [facility]. Pain after rest, not too bad during the day. Currently denies F/C/N/V. I have not seen him in over 2 years    No Known Allergies    No past medical history on file. Prior to Admission medications    Medication Sig Start Date End Date Taking?  Authorizing Provider   saxagliptin (ONGLYZA) 5 MG TABS tablet Take 1 tablet by mouth daily 7/21/20  Yes Jonnathan Rosen MD   pravastatin (PRAVACHOL) 40 MG tablet Take 1 tablet by mouth daily 10/11/19  Yes Jonnathan Rosen MD   metoprolol succinate (TOPROL XL) 25 MG extended release tablet Take 1 tablet by mouth daily 10/11/19  Yes Jonnathan Rosen MD   metFORMIN (GLUCOPHAGE) 1000 MG tablet Take 1 tablet by mouth 2 times daily (with meals) 10/11/19  Yes Jonnathan Rosen MD   glimepiride (AMARYL) 4 MG tablet TAKE ONE TABLET BY MOUTH ONCE DAILY 10/11/19  Yes Jonnathan Rosen MD   fenofibrate micronized (LOFIBRA) 134 MG capsule Take 1 capsule by mouth every morning (before breakfast) 10/11/19  Yes Jonnathan Rosen MD   enalapril (VASOTEC) 10 MG tablet Take 1 tablet by mouth daily 10/11/19  Yes Jonnathan Rosen MD   tadalafil (CIALIS) 20 MG tablet Take 1 tablet by mouth daily as needed for Erectile Dysfunction 10/9/19  Yes Jonnathan Rosen MD   aspirin EC 81 MG EC tablet Take 1 tablet by mouth daily 1/5/17  Yes Jonnathan Rosen MD   canagliflozin (INVOKANA) 300 MG TABS tablet Take 1 tablet by mouth every

## 2020-10-21 ENCOUNTER — OFFICE VISIT (OUTPATIENT)
Dept: PRIMARY CARE CLINIC | Age: 65
End: 2020-10-21
Payer: MEDICARE

## 2020-10-21 ENCOUNTER — HOSPITAL ENCOUNTER (OUTPATIENT)
Age: 65
Setting detail: SPECIMEN
Discharge: HOME | End: 2020-10-21
Payer: MEDICARE

## 2020-10-21 VITALS
TEMPERATURE: 97.1 F | SYSTOLIC BLOOD PRESSURE: 134 MMHG | BODY MASS INDEX: 29.93 KG/M2 | OXYGEN SATURATION: 98 % | HEIGHT: 72 IN | WEIGHT: 221 LBS | DIASTOLIC BLOOD PRESSURE: 70 MMHG | HEART RATE: 62 BPM

## 2020-10-21 LAB
ALBUMIN SERPL-MCNC: 4.3 G/DL (ref 3.5–5.2)
ALBUMIN/GLOBULIN RATIO: 1.3 (ref 1–2.5)
ALP BLD-CCNC: 104 U/L (ref 40–129)
ALT SERPL-CCNC: 77 U/L (ref 5–41)
ANION GAP SERPL CALCULATED.3IONS-SCNC: 13 MMOL/L (ref 9–17)
AST SERPL-CCNC: 67 U/L
BILIRUB SERPL-MCNC: 0.85 MG/DL (ref 0.3–1.2)
BILIRUBIN DIRECT: 0.34 MG/DL
BILIRUBIN, INDIRECT: 0.51 MG/DL (ref 0–1)
BUN BLDV-MCNC: 31 MG/DL (ref 8–23)
BUN/CREAT BLD: ABNORMAL (ref 9–20)
CALCIUM SERPL-MCNC: 9.9 MG/DL (ref 8.6–10.4)
CHLORIDE BLD-SCNC: 106 MMOL/L (ref 98–107)
CHOLESTEROL, FASTING: 196 MG/DL
CHOLESTEROL/HDL RATIO: 4.7
CO2: 22 MMOL/L (ref 20–31)
CREAT SERPL-MCNC: 1.29 MG/DL (ref 0.7–1.2)
CREATININE URINE POCT: NORMAL
GFR AFRICAN AMERICAN: >60 ML/MIN
GFR NON-AFRICAN AMERICAN: 56 ML/MIN
GFR SERPL CREATININE-BSD FRML MDRD: ABNORMAL ML/MIN/{1.73_M2}
GFR SERPL CREATININE-BSD FRML MDRD: ABNORMAL ML/MIN/{1.73_M2}
GLOBULIN: ABNORMAL G/DL (ref 1.5–3.8)
GLUCOSE FASTING: 100 MG/DL (ref 70–99)
HBA1C MFR BLD: 6.1 %
HDLC SERPL-MCNC: 42 MG/DL
LDL CHOLESTEROL: 127 MG/DL (ref 0–130)
MICROALBUMIN/CREAT 24H UR: NORMAL MG/G{CREAT}
MICROALBUMIN/CREAT UR-RTO: NORMAL
POTASSIUM SERPL-SCNC: 4.5 MMOL/L (ref 3.7–5.3)
PROSTATE SPECIFIC ANTIGEN: 2.06 UG/L
SODIUM BLD-SCNC: 141 MMOL/L (ref 135–144)
TOTAL PROTEIN: 7.7 G/DL (ref 6.4–8.3)
TRIGLYCERIDE, FASTING: 136 MG/DL
VLDLC SERPL CALC-MCNC: NORMAL MG/DL (ref 1–30)

## 2020-10-21 PROCEDURE — 82044 UR ALBUMIN SEMIQUANTITATIVE: CPT | Performed by: FAMILY MEDICINE

## 2020-10-21 PROCEDURE — G8484 FLU IMMUNIZE NO ADMIN: HCPCS | Performed by: FAMILY MEDICINE

## 2020-10-21 PROCEDURE — 83036 HEMOGLOBIN GLYCOSYLATED A1C: CPT | Performed by: FAMILY MEDICINE

## 2020-10-21 PROCEDURE — 4040F PNEUMOC VAC/ADMIN/RCVD: CPT | Performed by: FAMILY MEDICINE

## 2020-10-21 PROCEDURE — G8427 DOCREV CUR MEDS BY ELIG CLIN: HCPCS | Performed by: FAMILY MEDICINE

## 2020-10-21 PROCEDURE — 99213 OFFICE O/P EST LOW 20 MIN: CPT | Performed by: FAMILY MEDICINE

## 2020-10-21 PROCEDURE — 3017F COLORECTAL CA SCREEN DOC REV: CPT | Performed by: FAMILY MEDICINE

## 2020-10-21 PROCEDURE — 1123F ACP DISCUSS/DSCN MKR DOCD: CPT | Performed by: FAMILY MEDICINE

## 2020-10-21 PROCEDURE — 3044F HG A1C LEVEL LT 7.0%: CPT | Performed by: FAMILY MEDICINE

## 2020-10-21 PROCEDURE — 1036F TOBACCO NON-USER: CPT | Performed by: FAMILY MEDICINE

## 2020-10-21 PROCEDURE — 2022F DILAT RTA XM EVC RTNOPTHY: CPT | Performed by: FAMILY MEDICINE

## 2020-10-21 PROCEDURE — G8417 CALC BMI ABV UP PARAM F/U: HCPCS | Performed by: FAMILY MEDICINE

## 2020-10-21 PROCEDURE — 90694 VACC AIIV4 NO PRSRV 0.5ML IM: CPT | Performed by: FAMILY MEDICINE

## 2020-10-21 PROCEDURE — G0008 ADMIN INFLUENZA VIRUS VAC: HCPCS | Performed by: FAMILY MEDICINE

## 2020-10-21 RX ORDER — GLIMEPIRIDE 4 MG/1
TABLET ORAL
Qty: 90 TABLET | Refills: 3 | Status: SHIPPED | OUTPATIENT
Start: 2020-10-21 | End: 2020-10-22

## 2020-10-21 RX ORDER — FENOFIBRATE 134 MG/1
134 CAPSULE ORAL
Qty: 90 CAPSULE | Refills: 3 | Status: SHIPPED | OUTPATIENT
Start: 2020-10-21 | End: 2020-10-22

## 2020-10-21 RX ORDER — PRAVASTATIN SODIUM 40 MG
40 TABLET ORAL DAILY
Qty: 90 TABLET | Refills: 3 | Status: SHIPPED | OUTPATIENT
Start: 2020-10-21 | End: 2020-10-22

## 2020-10-21 RX ORDER — METOPROLOL SUCCINATE 25 MG/1
25 TABLET, EXTENDED RELEASE ORAL DAILY
Qty: 90 TABLET | Refills: 3 | Status: SHIPPED | OUTPATIENT
Start: 2020-10-21 | End: 2020-10-22

## 2020-10-21 RX ORDER — ENALAPRIL MALEATE 10 MG/1
10 TABLET ORAL DAILY
Qty: 90 TABLET | Refills: 3 | Status: SHIPPED | OUTPATIENT
Start: 2020-10-21 | End: 2020-10-22

## 2020-10-21 RX ORDER — CANAGLIFLOZIN 300 MG/1
300 TABLET, FILM COATED ORAL
Qty: 90 TABLET | Refills: 3 | Status: SHIPPED | OUTPATIENT
Start: 2020-10-21 | End: 2020-10-23 | Stop reason: SDUPTHER

## 2020-10-21 ASSESSMENT — PATIENT HEALTH QUESTIONNAIRE - PHQ9
1. LITTLE INTEREST OR PLEASURE IN DOING THINGS: 0
SUM OF ALL RESPONSES TO PHQ QUESTIONS 1-9: 0
2. FEELING DOWN, DEPRESSED OR HOPELESS: 0
SUM OF ALL RESPONSES TO PHQ9 QUESTIONS 1 & 2: 0

## 2020-10-21 ASSESSMENT — ENCOUNTER SYMPTOMS
NAUSEA: 0
EYE DISCHARGE: 0
SORE THROAT: 0
DIARRHEA: 0
EYE REDNESS: 0
WHEEZING: 0
RHINORRHEA: 0
VOMITING: 0
COUGH: 0
SHORTNESS OF BREATH: 0
ABDOMINAL PAIN: 0

## 2020-10-21 NOTE — PROGRESS NOTES
[address] [place] [address] PRIMARY CARE  [address] [facility] [place] [address]  Dept: [phone]    Virgilio Cortes is a 76 y.o. male who presents today for his medical conditions/complaintsas noted below. Chief Complaint   Patient presents with    Diabetes    Flu Vaccine       HPI:     HPI  Pt here for diabetic check. No chest pain or sob. No fever or chills. No low blood sugar reactions. Blood pressure 140/65-80. LDL Cholesterol (mg/dL)   Date Value   10/09/2019 112     LDL Calculated (mg/dL)   Date Value   10/05/2018 99   04/03/2017 109   01/07/2016 103       (goal LDL is <100)   AST (U/L)   Date Value   01/07/2016 44     ALT (U/L)   Date Value   01/07/2016 50     BUN (mg/dL)   Date Value   10/09/2019 27 (H)     BP Readings from Last 3 Encounters:   10/21/20 134/70   07/21/20 116/60   01/09/20 124/76          (goal 120/80)    No past medical history on file. Past Surgical History:   Procedure Laterality Date    FINGER AMPUTATION Left 06/06/2020    index finger by circular saw    JOINT REPLACEMENT      SHOULDER SURGERY Right        Family History   Problem Relation Age of Onset    Dementia Mother     Diabetes Mother     Lung Cancer Brother        Social History     Tobacco Use    Smoking status: Never Smoker    Smokeless tobacco: Never Used   Substance Use Topics    Alcohol use:  Yes     Alcohol/week: 0.0 standard drinks     Comment: very rarely      Current Outpatient Medications   Medication Sig Dispense Refill    sAXagliptin (ONGLYZA) 5 MG TABS tablet Take 1 tablet by mouth daily 90 tablet 3    canagliflozin (INVOKANA) 300 MG TABS tablet Take 1 tablet by mouth every morning (before breakfast) 90 tablet 3    pravastatin (PRAVACHOL) 40 MG tablet Take 1 tablet by mouth daily 90 tablet 3    metoprolol succinate (TOPROL XL) 25 MG extended release tablet Take 1 tablet by mouth daily 90 tablet 3    metFORMIN (GLUCOPHAGE) 1000 MG tablet Take 1 tablet by mouth 2 times 0.5ML (FLUAD)   3. Screening PSA (prostate specific antigen)  Psa screening   4. Encounter for lipid screening for cardiovascular disease  Lipid, Fasting   5. Annual physical exam  Basic Metabolic Panel, Fasting    Hepatic Function Panel   6. Type 2 diabetes mellitus without complication, without long-term current use of insulin (HCC)  pravastatin (PRAVACHOL) 40 MG tablet    metFORMIN (GLUCOPHAGE) 1000 MG tablet    glimepiride (AMARYL) 4 MG tablet    enalapril (VASOTEC) 10 MG tablet   7. Essential (primary) hypertension  metoprolol succinate (TOPROL XL) 25 MG extended release tablet        Plan:    Flu shot today. Blood work ordered. Renew medications. Return in about 4 months (around 2/21/2021), or medicare wellness.     Orders Placed This Encounter   Procedures    INFLUENZA, QUADV, ADJUVANTED, 65 YRS =, IM, PF, PREFILL SYR, 0.5ML (FLUAD)    Lipid, Fasting     Standing Status:   Future     Standing Expiration Date:   1/21/2021    Basic Metabolic Panel, Fasting     Standing Status:   Future     Standing Expiration Date:   1/21/2021    Hepatic Function Panel     Standing Status:   Future     Standing Expiration Date:   1/21/2021    Psa screening     Standing Status:   Future     Standing Expiration Date:   1/21/2021    POCT glycosylated hemoglobin (Hb A1C)    POCT microalbumin    MA COLLECTION CAPILLARY BLOOD SPECIMEN     Orders Placed This Encounter   Medications    sAXagliptin (ONGLYZA) 5 MG TABS tablet     Sig: Take 1 tablet by mouth daily     Dispense:  90 tablet     Refill:  3    canagliflozin (INVOKANA) 300 MG TABS tablet     Sig: Take 1 tablet by mouth every morning (before breakfast)     Dispense:  90 tablet     Refill:  3    pravastatin (PRAVACHOL) 40 MG tablet     Sig: Take 1 tablet by mouth daily     Dispense:  90 tablet     Refill:  3    metoprolol succinate (TOPROL XL) 25 MG extended release tablet     Sig: Take 1 tablet by mouth daily     Dispense:  90 tablet     Refill:  3    metFORMIN (GLUCOPHAGE) 1000 MG tablet     Sig: Take 1 tablet by mouth 2 times daily (with meals)     Dispense:  180 tablet     Refill:  3    glimepiride (AMARYL) 4 MG tablet     Sig: TAKE ONE TABLET BY MOUTH ONCE DAILY     Dispense:  90 tablet     Refill:  3     Please consider 90 day supplies to promote better adherence    fenofibrate micronized (LOFIBRA) 134 MG capsule     Sig: Take 1 capsule by mouth every morning (before breakfast)     Dispense:  90 capsule     Refill:  3    enalapril (VASOTEC) 10 MG tablet     Sig: Take 1 tablet by mouth daily     Dispense:  90 tablet     Refill:  3       Patient given educationalmaterials - see patient instructions. Discussed use, benefit, and side effectsof prescribed medications. All patient questions answered. Pt voiced understanding. Reviewed health maintenance. Instructed to continue current medications, diet andexercise. Patient agreed with treatment plan. Follow up as directed.      Electronicallysigned by Buck Liz MD on 10/21/2020 at 9:15 AM

## 2020-10-22 RX ORDER — PRAVASTATIN SODIUM 40 MG
TABLET ORAL
Qty: 90 TABLET | Refills: 0 | Status: SHIPPED | OUTPATIENT
Start: 2020-10-22 | End: 2020-10-23 | Stop reason: SDUPTHER

## 2020-10-22 RX ORDER — GLIMEPIRIDE 4 MG/1
TABLET ORAL
Qty: 90 TABLET | Refills: 0 | Status: SHIPPED | OUTPATIENT
Start: 2020-10-22 | End: 2020-10-23 | Stop reason: SDUPTHER

## 2020-10-22 RX ORDER — ENALAPRIL MALEATE 10 MG/1
TABLET ORAL
Qty: 90 TABLET | Refills: 0 | Status: SHIPPED | OUTPATIENT
Start: 2020-10-22 | End: 2020-10-23 | Stop reason: SDUPTHER

## 2020-10-22 RX ORDER — FENOFIBRATE 134 MG/1
CAPSULE ORAL
Qty: 90 CAPSULE | Refills: 0 | Status: SHIPPED | OUTPATIENT
Start: 2020-10-22 | End: 2020-10-23 | Stop reason: SDUPTHER

## 2020-10-22 RX ORDER — METOPROLOL SUCCINATE 25 MG/1
TABLET, EXTENDED RELEASE ORAL
Qty: 90 TABLET | Refills: 0 | Status: SHIPPED | OUTPATIENT
Start: 2020-10-22 | End: 2020-10-23 | Stop reason: SDUPTHER

## 2020-10-23 RX ORDER — GLIMEPIRIDE 4 MG/1
TABLET ORAL
Qty: 90 TABLET | Refills: 0 | Status: SHIPPED | OUTPATIENT
Start: 2020-10-23 | End: 2021-01-26

## 2020-10-23 RX ORDER — CANAGLIFLOZIN 300 MG/1
300 TABLET, FILM COATED ORAL
Qty: 90 TABLET | Refills: 3 | Status: SHIPPED | OUTPATIENT
Start: 2020-10-23 | End: 2021-01-26

## 2020-10-23 RX ORDER — ENALAPRIL MALEATE 10 MG/1
10 TABLET ORAL DAILY
Qty: 90 TABLET | Refills: 0 | Status: SHIPPED | OUTPATIENT
Start: 2020-10-23 | End: 2021-01-26

## 2020-10-23 RX ORDER — PRAVASTATIN SODIUM 40 MG
TABLET ORAL
Qty: 90 TABLET | Refills: 0 | Status: SHIPPED | OUTPATIENT
Start: 2020-10-23 | End: 2021-01-26

## 2020-10-23 RX ORDER — METOPROLOL SUCCINATE 25 MG/1
TABLET, EXTENDED RELEASE ORAL
Qty: 90 TABLET | Refills: 0 | Status: SHIPPED | OUTPATIENT
Start: 2020-10-23 | End: 2021-01-26

## 2020-10-23 RX ORDER — FENOFIBRATE 134 MG/1
134 CAPSULE ORAL
Qty: 90 CAPSULE | Refills: 0 | Status: SHIPPED | OUTPATIENT
Start: 2020-10-23 | End: 2021-01-26

## 2020-12-31 ENCOUNTER — TELEPHONE (OUTPATIENT)
Dept: PRIMARY CARE CLINIC | Age: 65
End: 2020-12-31

## 2020-12-31 NOTE — TELEPHONE ENCOUNTER
Pt c/o sinus dripping times 4 days. No fever. Temp was 97.6. Has not tried any otc meds. Pt said that he used a spray in the past, but did not help at all. Pt diabetic and not sure what he can take. Asking what you recommend? Pt denies any covid symptoms. Uses [facility] on [address] listed.

## 2021-01-21 ENCOUNTER — TELEPHONE (OUTPATIENT)
Dept: PRIMARY CARE CLINIC | Age: 66
End: 2021-01-21

## 2021-01-21 NOTE — TELEPHONE ENCOUNTER
[facility] and [facility] insurance company will not cover the canagliflozin (INVOKANA) 300 MG TABS tablet.   Pt advised to call the insurance company and find out what is covered that is similar

## 2021-01-26 ENCOUNTER — OFFICE VISIT (OUTPATIENT)
Dept: PRIMARY CARE CLINIC | Age: 66
End: 2021-01-26
Payer: MEDICARE

## 2021-01-26 VITALS
TEMPERATURE: 97.5 F | HEART RATE: 64 BPM | SYSTOLIC BLOOD PRESSURE: 110 MMHG | OXYGEN SATURATION: 98 % | WEIGHT: 215 LBS | HEIGHT: 72 IN | BODY MASS INDEX: 29.12 KG/M2 | DIASTOLIC BLOOD PRESSURE: 76 MMHG

## 2021-01-26 DIAGNOSIS — E66.9 DIABETES MELLITUS TYPE 2 IN OBESE (HCC): ICD-10-CM

## 2021-01-26 DIAGNOSIS — E11.69 DIABETES MELLITUS TYPE 2 IN OBESE (HCC): ICD-10-CM

## 2021-01-26 DIAGNOSIS — I10 ESSENTIAL (PRIMARY) HYPERTENSION: ICD-10-CM

## 2021-01-26 DIAGNOSIS — Z00.00 ROUTINE GENERAL MEDICAL EXAMINATION AT A HEALTH CARE FACILITY: Primary | ICD-10-CM

## 2021-01-26 DIAGNOSIS — E11.9 TYPE 2 DIABETES MELLITUS WITHOUT COMPLICATION, WITHOUT LONG-TERM CURRENT USE OF INSULIN (HCC): ICD-10-CM

## 2021-01-26 DIAGNOSIS — S68.119S: ICD-10-CM

## 2021-01-26 LAB — HBA1C MFR BLD: 7.2 %

## 2021-01-26 PROCEDURE — 1123F ACP DISCUSS/DSCN MKR DOCD: CPT | Performed by: FAMILY MEDICINE

## 2021-01-26 PROCEDURE — 4040F PNEUMOC VAC/ADMIN/RCVD: CPT | Performed by: FAMILY MEDICINE

## 2021-01-26 PROCEDURE — G0439 PPPS, SUBSEQ VISIT: HCPCS | Performed by: FAMILY MEDICINE

## 2021-01-26 PROCEDURE — G8484 FLU IMMUNIZE NO ADMIN: HCPCS | Performed by: FAMILY MEDICINE

## 2021-01-26 PROCEDURE — 3017F COLORECTAL CA SCREEN DOC REV: CPT | Performed by: FAMILY MEDICINE

## 2021-01-26 PROCEDURE — 3051F HG A1C>EQUAL 7.0%<8.0%: CPT | Performed by: FAMILY MEDICINE

## 2021-01-26 PROCEDURE — 83036 HEMOGLOBIN GLYCOSYLATED A1C: CPT | Performed by: FAMILY MEDICINE

## 2021-01-26 RX ORDER — ASCORBIC ACID 500 MG
500 TABLET ORAL 2 TIMES DAILY
COMMUNITY

## 2021-01-26 RX ORDER — FENOFIBRATE 134 MG/1
134 CAPSULE ORAL
Qty: 90 CAPSULE | Refills: 3 | Status: SHIPPED | OUTPATIENT
Start: 2021-01-26 | End: 2021-04-27 | Stop reason: SDUPTHER

## 2021-01-26 RX ORDER — ENALAPRIL MALEATE 10 MG/1
10 TABLET ORAL DAILY
Qty: 90 TABLET | Refills: 3 | Status: SHIPPED | OUTPATIENT
Start: 2021-01-26 | End: 2021-04-27 | Stop reason: SDUPTHER

## 2021-01-26 RX ORDER — GLIMEPIRIDE 4 MG/1
TABLET ORAL
Qty: 90 TABLET | Refills: 3 | Status: SHIPPED | OUTPATIENT
Start: 2021-01-26 | End: 2021-04-27 | Stop reason: SDUPTHER

## 2021-01-26 RX ORDER — PRAVASTATIN SODIUM 40 MG
TABLET ORAL
Qty: 90 TABLET | Refills: 3 | Status: SHIPPED | OUTPATIENT
Start: 2021-01-26 | End: 2021-04-27 | Stop reason: SDUPTHER

## 2021-01-26 RX ORDER — METOPROLOL SUCCINATE 25 MG/1
TABLET, EXTENDED RELEASE ORAL
Qty: 90 TABLET | Refills: 3 | Status: SHIPPED | OUTPATIENT
Start: 2021-01-26 | End: 2021-04-27 | Stop reason: SDUPTHER

## 2021-01-26 SDOH — ECONOMIC STABILITY: FOOD INSECURITY: WITHIN THE PAST 12 MONTHS, YOU WORRIED THAT YOUR FOOD WOULD RUN OUT BEFORE YOU GOT MONEY TO BUY MORE.: NEVER TRUE

## 2021-01-26 SDOH — ECONOMIC STABILITY: FOOD INSECURITY: WITHIN THE PAST 12 MONTHS, THE FOOD YOU BOUGHT JUST DIDN'T LAST AND YOU DIDN'T HAVE MONEY TO GET MORE.: NEVER TRUE

## 2021-01-26 ASSESSMENT — PATIENT HEALTH QUESTIONNAIRE - PHQ9
1. LITTLE INTEREST OR PLEASURE IN DOING THINGS: 0
SUM OF ALL RESPONSES TO PHQ QUESTIONS 1-9: 0

## 2021-01-26 NOTE — PROGRESS NOTES
Medicare Annual Wellness Visit  Name: Poornima Batista Date: 2021   MRN: [id number] Sex: Male   Age: 76 y.o. Ethnicity: Non-/Non    : 1955 Race: Trev Trinidad is here for Medicare AWV    Screenings for behavioral, psychosocial and functional/safety risks, and cognitive dysfunction are all negative except as indicated below. These results, as well as other patient data from the [address] form, are documented in Flowsheets linked to this Encounter.  states insurance will no longer cover [address] and [address]. Still taking them. No Known Allergies      Prior to Visit Medications    Medication Sig Taking? Authorizing Provider   ascorbic acid (VITAMIN C) 500 MG tablet Take 500 mg by mouth 2 times daily Yes Historical Provider, MD   dapagliflozin (FARXIGA) 10 MG tablet Take 1 tablet by mouth every morning Yes Irving Dick MD   enalapril (VASOTEC) 10 MG tablet Take 1 tablet by mouth daily Yes Irving Dick MD   fenofibrate micronized (LOFIBRA) 134 MG capsule Take 1 capsule by mouth every morning (before breakfast) Yes Irving Dick MD   glimepiride (AMARYL) 4 MG tablet Take 1 tablet by mouth once daily Yes Irving Dick MD   metFORMIN (GLUCOPHAGE) 1000 MG tablet TAKE 1 TABLET BY MOUTH TWICE DAILY WITH MEALS Yes Irving Dick MD   metoprolol succinate (TOPROL XL) 25 MG extended release tablet Take 1 tablet by mouth once daily Yes Irving Dick MD   pravastatin (PRAVACHOL) 40 MG tablet Take 1 tablet by mouth once daily Yes Irving Dick MD   sAXagliptin (ONGLYZA) 5 MG TABS tablet Take 1 tablet by mouth daily Yes Irving Dick MD   ibuprofen-famotidine (DUEXIS) 800-26.6 MG TABS Take 1 tablet by mouth 3 times daily as needed (PAIN) Yes Vipul Owens DPM   aspirin EC 81 MG EC tablet Take 1 tablet by mouth daily Yes Irving Dick MD       No past medical history on file.     Past Surgical History: Procedure Laterality Date    FINGER AMPUTATION Left 06/06/2020    index finger by circular saw    SHOULDER ARTHROPLASTY Right 2013    TONSILLECTOMY  1964         Family History   Problem Relation Age of Onset    Dementia Mother     Diabetes Mother     Lung Cancer Brother        CareTeam (Including outside providers/suppliers regularly involved in providing care):   Patient Care Team:  Simon Hein MD as PCP - General (Family Medicine)  Simon Hein MD as P[facility]    Wt Readings from Last 3 Encounters:   01/26/21 215 lb (97.5 kg)   10/21/20 221 lb (100.2 kg)   08/12/20 214 lb (97.1 kg)     Vitals:    01/26/21 0851   BP: 110/76   Pulse: 64   Temp: 97.5 °F (36.4 °C)   SpO2: 98%   Weight: 215 lb (97.5 kg)   Height: 6' (1.829 m)     Body mass index is 29.16 kg/m². Based upon direct observation of the patient, evaluation of cognition reveals recent and remote memory intact.     General Appearance: alert and oriented to person, place and time, well developed and well- nourished, in no acute distress  Skin: warm and dry, no rash or erythema  Head: normocephalic and atraumatic  Eyes: pupils equal, round, and reactive to light, extraocular eye movements intact, conjunctivae normal  ENT: tympanic membrane, external ear and ear canal normal bilaterally, nose without deformity, nasal mucosa and turbinates normal without polyps  Neck: supple and non-tender without mass, no thyromegaly or thyroid nodules, no cervical lymphadenopathy  Pulmonary/Chest: clear to auscultation bilaterally- no wheezes, rales or rhonchi, normal air movement, no respiratory distress  Cardiovascular: normal rate, regular rhythm, normal S1 and S2, no murmurs, rubs, clicks, or gallops, distal pulses intact, no carotid bruits  Abdomen: soft, non-tender, non-distended, normal bowel sounds, no masses or organomegaly  Extremities: no cyanosis, clubbing or edema  Influenza, Quadv, adjuvanted, 65 yrs +, IM, PF (Fluad) 10/21/2020    Pneumococcal Conjugate 13-valent ([id number]) 10/03/2016, 10/09/2019    Pneumococcal Polysaccharide ([id number]) 04/10/2018    Tdap (Boostrix, Adacel) 01/01/2012, 06/07/2020    Zoster Live (Zostavax) 04/06/2017        Health Maintenance   Topic Date Due    Shingles Vaccine (2 of 3) 06/01/2017    Annual Wellness Visit (AWV)  10/09/2019    Diabetic retinal exam  12/01/2019    Diabetic microalbuminuria test  10/21/2021    Lipid screen  10/21/2021    Potassium monitoring  10/21/2021    Creatinine monitoring  10/21/2021    Diabetic foot exam  01/26/2022    A1C test (Diabetic or Prediabetic)  01/26/2022    Pneumococcal 65+ years Vaccine (2 of 2 - PPSV23) 04/10/2023    Colon cancer screen colonoscopy  12/12/2029    DTaP/Tdap/Td vaccine (3 - Td) 06/07/2030    Flu vaccine  Completed    Hepatitis C screen  Completed    Hepatitis A vaccine  Aged Out    Hib vaccine  Aged Out    Meningococcal (ACWY) vaccine  Aged Out     Recommendations for [employer] Due: see orders and patient instructions/AVS.  . Recommended screening schedule for the next 5-10 years is provided to the patient in written form: see Patient Instructions/AVS.    Christopher Duong was seen today for medicare awv. Diagnoses and all orders for this visit:    Diabetes mellitus type 2 in obese ([facility] [address]  -     MO COLLECTION CAPILLARY BLOOD SPECIMEN  -     POCT glycosylated hemoglobin (Hb A1C)  -      DIABETES FOOT EXAM  Stable. Change Invokana to Bassem Hammond due to insurance. If Bassem Shackleton not covered, consider Jardiance. 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 No pain, no open wound. Routine general medical examination at a health care facility  Done    Other orders  -     dapagliflozin (FARXIGA) 10 MG tablet;  Take 1 tablet by mouth every morning

## 2021-02-03 ENCOUNTER — OFFICE VISIT (OUTPATIENT)
Dept: PODIATRY | Age: 66
End: 2021-02-03
Payer: MEDICARE

## 2021-02-03 VITALS — BODY MASS INDEX: 29.12 KG/M2 | HEIGHT: 72 IN | WEIGHT: 215 LBS

## 2021-02-03 DIAGNOSIS — M72.2 PLANTAR FASCIITIS, BILATERAL: Primary | ICD-10-CM

## 2021-02-03 DIAGNOSIS — M77.51 RIGHT CALCANEAL BURSITIS: ICD-10-CM

## 2021-02-03 DIAGNOSIS — M79.672 FOOT PAIN, LEFT: ICD-10-CM

## 2021-02-03 DIAGNOSIS — E11.42 TYPE 2 DIABETES MELLITUS WITH DIABETIC POLYNEUROPATHY, WITHOUT LONG-TERM CURRENT USE OF INSULIN (HCC): ICD-10-CM

## 2021-02-03 DIAGNOSIS — M79.671 FOOT PAIN, RIGHT: ICD-10-CM

## 2021-02-03 DIAGNOSIS — Q66.70 PES CAVUS: ICD-10-CM

## 2021-02-03 DIAGNOSIS — M77.52 BURSITIS OF HEEL, LEFT: ICD-10-CM

## 2021-02-03 PROCEDURE — G8484 FLU IMMUNIZE NO ADMIN: HCPCS | Performed by: PODIATRIST

## 2021-02-03 PROCEDURE — 99213 OFFICE O/P EST LOW 20 MIN: CPT | Performed by: PODIATRIST

## 2021-02-03 PROCEDURE — 3051F HG A1C>EQUAL 7.0%<8.0%: CPT | Performed by: PODIATRIST

## 2021-02-03 PROCEDURE — 2022F DILAT RTA XM EVC RTNOPTHY: CPT | Performed by: PODIATRIST

## 2021-02-03 PROCEDURE — G8427 DOCREV CUR MEDS BY ELIG CLIN: HCPCS | Performed by: PODIATRIST

## 2021-02-03 PROCEDURE — 4040F PNEUMOC VAC/ADMIN/RCVD: CPT | Performed by: PODIATRIST

## 2021-02-03 PROCEDURE — G8417 CALC BMI ABV UP PARAM F/U: HCPCS | Performed by: PODIATRIST

## 2021-02-03 PROCEDURE — 1123F ACP DISCUSS/DSCN MKR DOCD: CPT | Performed by: PODIATRIST

## 2021-02-03 PROCEDURE — 1036F TOBACCO NON-USER: CPT | Performed by: PODIATRIST

## 2021-02-03 PROCEDURE — 3017F COLORECTAL CA SCREEN DOC REV: CPT | Performed by: PODIATRIST

## 2021-02-03 ASSESSMENT — ENCOUNTER SYMPTOMS
DIARRHEA: 0
NAUSEA: 0
COLOR CHANGE: 0
VOMITING: 0
CONSTIPATION: 0

## 2021-02-03 NOTE — PROGRESS NOTES
Musculoskeletal: Muscle strength 5/5, Bilateral.  Pain present upon palpation of central heel, medial calcaneal tubercle, Bilateral. increased medial longitudinal arch, Bilateral.  Ankle ROM normal,Bilateral.  1st MPJ ROM normal, Bilateral.      Integument: Warm, dry, supple, Bilateral.  Open lesion absent, Bilateral.      Radiographs: 3 views right Foot:  Large plantar calcaneal exostosis. High medial arch height. No acute pathology. Radiographs: 3 views left Foot:   Large plantar calcaneal exostosis. High medial arch height. No acute pathology. Asessment: Patient is a 76 y.o. male with:   1. Plantar fasciitis, bilateral    2. Bursitis of heel, left    3. Right calcaneal bursitis    4. Foot pain, left    5. Foot pain, right    6. Pes cavus    7. Type 2 diabetes mellitus with diabetic polyneuropathy, without long-term current use of insulin ([facility] [address]          Plan: Patient examined and evaluated. Current condition and treatment options discussed in detail. Advised pt to continue with good shoes. Verbal and written instructions given to patient. Contact office with any questions/problems/concerns. 1) good shoes  2) custom orthotics  3) HEP  4) NSAIDS-Duexis    Diabetic foot examination performed this visit. The exam included neurological sensory exam, a 10-g monofilament and pinprick sensation, vibration using a 128-Hz tuning fork, ankle reflexes, visual skin inspection, vascular exam including assessment of pedal pulses, orthopedic exam for deformities, and shoe inspection. Increased risk factors noted on the diabetic foot exam include decreased sensory exam and peripheral neuropathy. Shoegear inspected and found to be appropriate size and wear. Diabetic shoes and insoles: This patient would benefit from extra depth diabetic shoes and custom inserts. I feel he/she qualifies due to the above performed physical exam and diagnosis. I will do the appropriate paperwork and send it to their primary care physician. Then the patient will be measured for shoes and custom inserts to properly off load and protect his/her feet. No orders of the defined types were placed in this encounter. No orders of the defined types were placed in this encounter. RTC in as needed.     2/3/2021

## 2021-02-15 ENCOUNTER — NURSE ONLY (OUTPATIENT)
Dept: PODIATRY | Age: 66
End: 2021-02-15

## 2021-03-22 ENCOUNTER — NURSE ONLY (OUTPATIENT)
Dept: PODIATRY | Age: 66
End: 2021-03-22
Payer: MEDICARE

## 2021-03-22 VITALS — HEIGHT: 72 IN | BODY MASS INDEX: 29.12 KG/M2 | WEIGHT: 215 LBS

## 2021-03-22 DIAGNOSIS — E11.42 TYPE 2 DIABETES MELLITUS WITH DIABETIC POLYNEUROPATHY, WITHOUT LONG-TERM CURRENT USE OF INSULIN (HCC): Primary | ICD-10-CM

## 2021-03-22 DIAGNOSIS — M77.52 BURSITIS OF HEEL, LEFT: ICD-10-CM

## 2021-03-22 DIAGNOSIS — M79.671 FOOT PAIN, RIGHT: ICD-10-CM

## 2021-03-22 DIAGNOSIS — M77.51 RIGHT CALCANEAL BURSITIS: ICD-10-CM

## 2021-03-22 DIAGNOSIS — M79.672 FOOT PAIN, LEFT: ICD-10-CM

## 2021-03-22 DIAGNOSIS — M72.2 PLANTAR FASCIITIS, BILATERAL: ICD-10-CM

## 2021-03-22 PROCEDURE — A5513 MULTI DEN INSERT CUSTOM MOLD: HCPCS | Performed by: PODIATRIST

## 2021-03-22 PROCEDURE — A5500 DIAB SHOE FOR DENSITY INSERT: HCPCS | Performed by: PODIATRIST

## 2021-03-22 NOTE — PROGRESS NOTES
Dispensed 1 pair of diabetic shoes with 3 pairs of custom inserts. Appropriate paperwork was obtained from [facility] physician & the pt was fitted in the office.

## 2021-04-27 ENCOUNTER — OFFICE VISIT (OUTPATIENT)
Dept: PRIMARY CARE CLINIC | Age: 66
End: 2021-04-27
Payer: MEDICARE

## 2021-04-27 VITALS
DIASTOLIC BLOOD PRESSURE: 60 MMHG | OXYGEN SATURATION: 97 % | BODY MASS INDEX: 29.36 KG/M2 | SYSTOLIC BLOOD PRESSURE: 110 MMHG | WEIGHT: 216.8 LBS | HEIGHT: 72 IN | HEART RATE: 65 BPM

## 2021-04-27 DIAGNOSIS — I10 ESSENTIAL (PRIMARY) HYPERTENSION: ICD-10-CM

## 2021-04-27 DIAGNOSIS — E11.9 TYPE 2 DIABETES MELLITUS WITHOUT COMPLICATION, WITHOUT LONG-TERM CURRENT USE OF INSULIN (HCC): ICD-10-CM

## 2021-04-27 DIAGNOSIS — E11.69 DIABETES MELLITUS TYPE 2 IN OBESE (HCC): Primary | ICD-10-CM

## 2021-04-27 DIAGNOSIS — E66.9 DIABETES MELLITUS TYPE 2 IN OBESE (HCC): Primary | ICD-10-CM

## 2021-04-27 LAB — HBA1C MFR BLD: 7.8 %

## 2021-04-27 PROCEDURE — G8427 DOCREV CUR MEDS BY ELIG CLIN: HCPCS | Performed by: FAMILY MEDICINE

## 2021-04-27 PROCEDURE — 1123F ACP DISCUSS/DSCN MKR DOCD: CPT | Performed by: FAMILY MEDICINE

## 2021-04-27 PROCEDURE — 83036 HEMOGLOBIN GLYCOSYLATED A1C: CPT | Performed by: FAMILY MEDICINE

## 2021-04-27 PROCEDURE — 1036F TOBACCO NON-USER: CPT | Performed by: FAMILY MEDICINE

## 2021-04-27 PROCEDURE — 2022F DILAT RTA XM EVC RTNOPTHY: CPT | Performed by: FAMILY MEDICINE

## 2021-04-27 PROCEDURE — 4040F PNEUMOC VAC/ADMIN/RCVD: CPT | Performed by: FAMILY MEDICINE

## 2021-04-27 PROCEDURE — 3051F HG A1C>EQUAL 7.0%<8.0%: CPT | Performed by: FAMILY MEDICINE

## 2021-04-27 PROCEDURE — 3017F COLORECTAL CA SCREEN DOC REV: CPT | Performed by: FAMILY MEDICINE

## 2021-04-27 PROCEDURE — G8417 CALC BMI ABV UP PARAM F/U: HCPCS | Performed by: FAMILY MEDICINE

## 2021-04-27 PROCEDURE — 99213 OFFICE O/P EST LOW 20 MIN: CPT | Performed by: FAMILY MEDICINE

## 2021-04-27 RX ORDER — ENALAPRIL MALEATE 10 MG/1
10 TABLET ORAL DAILY
Qty: 90 TABLET | Refills: 3 | Status: SHIPPED | OUTPATIENT
Start: 2021-04-27

## 2021-04-27 RX ORDER — PRAVASTATIN SODIUM 40 MG
TABLET ORAL
Qty: 90 TABLET | Refills: 3 | Status: SHIPPED | OUTPATIENT
Start: 2021-04-27 | End: 2022-05-03

## 2021-04-27 RX ORDER — METOPROLOL SUCCINATE 25 MG/1
TABLET, EXTENDED RELEASE ORAL
Qty: 90 TABLET | Refills: 3 | Status: SHIPPED | OUTPATIENT
Start: 2021-04-27

## 2021-04-27 RX ORDER — GLIMEPIRIDE 4 MG/1
TABLET ORAL
Qty: 90 TABLET | Refills: 3 | Status: SHIPPED | OUTPATIENT
Start: 2021-04-27

## 2021-04-27 RX ORDER — FENOFIBRATE 134 MG/1
134 CAPSULE ORAL
Qty: 90 CAPSULE | Refills: 3 | Status: SHIPPED | OUTPATIENT
Start: 2021-04-27 | End: 2022-05-03

## 2021-04-27 ASSESSMENT — ENCOUNTER SYMPTOMS
DIARRHEA: 0
SORE THROAT: 0
RHINORRHEA: 0
EYE REDNESS: 0
NAUSEA: 0
WHEEZING: 0
VOMITING: 0
EYE DISCHARGE: 0
ABDOMINAL PAIN: 0
COUGH: 0
SHORTNESS OF BREATH: 0

## 2021-04-27 ASSESSMENT — PATIENT HEALTH QUESTIONNAIRE - PHQ9
1. LITTLE INTEREST OR PLEASURE IN DOING THINGS: 0
2. FEELING DOWN, DEPRESSED OR HOPELESS: 0
SUM OF ALL RESPONSES TO PHQ QUESTIONS 1-9: 0

## 2021-04-27 NOTE — PROGRESS NOTES
[address] PRIMARY CARE  [address] [address] [facility] [address]  Dept: [phone]    Darylene Keas is a 76 y.o. male Established patient, who presents today for his medical conditions/complaints as noted below. Chief Complaint   Patient presents with    Hypertension    Hyperlipidemia    Diabetes       HPI:     HPI  Pt had Covid in December. Still with fatigue. No light headed or dizziness. No chest pain or sob. No melena or hematochezia. Patient states has not been doing the best with diet and exercise. Patient has not been checking his blood pressure outside the office. Reviewed prior notes None  Reviewed previous Labs    LDL Cholesterol (mg/dL)   Date Value   10/21/2020 127   10/09/2019 112     LDL Calculated (mg/dL)   Date Value   10/05/2018 99   04/03/2017 109   01/07/2016 103       (goal LDL is <100)   AST (U/L)   Date Value   10/21/2020 67 (H)     ALT (U/L)   Date Value   10/21/2020 77 (H)     BUN (mg/dL)   Date Value   10/21/2020 31 (H)     Hemoglobin A1C (%)   Date Value   04/27/2021 7.8     BP Readings from Last 3 Encounters:   04/27/21 110/60   01/26/21 110/76   10/21/20 134/70          (goal 120/80)    No past medical history on file. Past Surgical History:   Procedure Laterality Date    FINGER AMPUTATION Left 06/06/2020    index finger by circular saw    SHOULDER ARTHROPLASTY Right 2013    TONSILLECTOMY  1964       Family History   Problem Relation Age of Onset    Dementia Mother     Diabetes Mother     Lung Cancer Brother        Social History     Tobacco Use    Smoking status: Never Smoker    Smokeless tobacco: Never Used   Substance Use Topics    Alcohol use:  Yes     Alcohol/week: 0.0 standard drinks     Comment: very rarely      Current Outpatient Medications   Medication Sig Dispense Refill    dapagliflozin (FARXIGA) 10 MG tablet Take 1 tablet by mouth every morning 90 tablet 3    enalapril (VASOTEC) 10 MG tablet Take 1 tablet by Negative for abdominal pain, diarrhea, nausea and vomiting. Genitourinary: Negative for dysuria and frequency. Musculoskeletal: Negative for arthralgias and myalgias. Neurological: Negative for dizziness, light-headedness and headaches. Psychiatric/Behavioral: Negative for sleep disturbance. Objective:     /60   Pulse 65   Ht 6' (1.829 m)   Wt 216 lb 12.8 oz (98.3 kg)   SpO2 97%   BMI 29.40 kg/m²   Physical Exam  Vitals signs and nursing note reviewed. Constitutional:       General: He is not in acute distress. Appearance: He is well-developed. He is not ill-appearing. HENT:      Head: Normocephalic and atraumatic. Right Ear: External ear normal.      Left Ear: External ear normal.   Eyes:      General: No scleral icterus. Right eye: No discharge. Left eye: No discharge. Conjunctiva/sclera: Conjunctivae normal.      Pupils: Pupils are equal, round, and reactive to light. Neck:      Thyroid: No thyromegaly. Trachea: No tracheal deviation. Cardiovascular:      Rate and Rhythm: Normal rate and regular rhythm. Heart sounds: Normal heart sounds. Pulmonary:      Effort: Pulmonary effort is normal. No respiratory distress. Breath sounds: Normal breath sounds. No wheezing. Lymphadenopathy:      Cervical: No cervical adenopathy. Skin:     General: Skin is warm. Findings: No rash. Neurological:      Mental Status: He is alert and oriented to person, place, and time. Psychiatric:         Mood and Affect: Mood normal.         Behavior: Behavior normal.         Thought Content: Thought content normal.         Assessment:       Diagnosis Orders   1. Diabetes mellitus type 2 in obese ([facility])  AK COLLECTION CAPILLARY BLOOD SPECIMEN    POCT glycosylated hemoglobin (Hb A1C)   2.  Type 2 diabetes mellitus without complication, without long-term current use of insulin ([facility])  enalapril (VASOTEC) 10 MG tablet    glimepiride (AMARYL) 4 MG tablet metFORMIN (GLUCOPHAGE) 1000 MG tablet    pravastatin (PRAVACHOL) 40 MG tablet   3. Essential (primary) hypertension  metoprolol succinate (TOPROL XL) 25 MG extended release tablet        Plan:    Renew medications. Covid vaccine discussed. Advise patient if hemoglobin A1c goes any higher, would need to start long-acting insulin. Return in about 3 months (around 7/27/2021) for DIABETES. Orders Placed This Encounter   Procedures    POCT glycosylated hemoglobin (Hb A1C)    CO COLLECTION CAPILLARY BLOOD SPECIMEN     Orders Placed This Encounter   Medications    dapagliflozin (FARXIGA) 10 MG tablet     Sig: Take 1 tablet by mouth every morning     Dispense:  90 tablet     Refill:  3    enalapril (VASOTEC) 10 MG tablet     Sig: Take 1 tablet by mouth daily     Dispense:  90 tablet     Refill:  3    fenofibrate micronized (LOFIBRA) 134 MG capsule     Sig: Take 1 capsule by mouth every morning (before breakfast)     Dispense:  90 capsule     Refill:  3    glimepiride (AMARYL) 4 MG tablet     Sig: Take 1 tablet by mouth once daily     Dispense:  90 tablet     Refill:  3    metFORMIN (GLUCOPHAGE) 1000 MG tablet     Sig: TAKE 1 TABLET BY MOUTH TWICE DAILY WITH MEALS     Dispense:  180 tablet     Refill:  3    metoprolol succinate (TOPROL XL) 25 MG extended release tablet     Sig: Take 1 tablet by mouth once daily     Dispense:  90 tablet     Refill:  3    pravastatin (PRAVACHOL) 40 MG tablet     Sig: Take 1 tablet by mouth once daily     Dispense:  90 tablet     Refill:  3    sAXagliptin (ONGLYZA) 5 MG TABS tablet     Sig: Take 1 tablet by mouth daily     Dispense:  90 tablet     Refill:  3       Patient given educational materials - see patient instructions. Discussed use, benefit, and side effects of prescribed medications. All patient questions answered. Pt voiced understanding. Reviewed health maintenance. Instructed to continue current medications, diet andexercise. Patient agreed with treatment plan. Follow up as directed.      Electronicallysigned by uArelio Hay MD on 4/27/2021 at 9:22 AM

## 2022-05-02 DIAGNOSIS — E11.9 TYPE 2 DIABETES MELLITUS WITHOUT COMPLICATION, WITHOUT LONG-TERM CURRENT USE OF INSULIN (HCC): ICD-10-CM

## 2022-05-03 RX ORDER — DAPAGLIFLOZIN 10 MG/1
TABLET, FILM COATED ORAL
Qty: 90 TABLET | Refills: 0 | Status: SHIPPED | OUTPATIENT
Start: 2022-05-03

## 2022-05-03 RX ORDER — FENOFIBRATE 134 MG/1
CAPSULE ORAL
Qty: 90 CAPSULE | Refills: 0 | Status: SHIPPED | OUTPATIENT
Start: 2022-05-03

## 2022-05-03 RX ORDER — PRAVASTATIN SODIUM 40 MG
TABLET ORAL
Qty: 90 TABLET | Refills: 0 | Status: SHIPPED | OUTPATIENT
Start: 2022-05-03

## 2022-08-04 RX ORDER — FENOFIBRATE 134 MG/1
CAPSULE ORAL
Qty: 90 CAPSULE | Refills: 0 | OUTPATIENT
Start: 2022-08-04

## 2022-08-04 RX ORDER — DAPAGLIFLOZIN 10 MG/1
TABLET, FILM COATED ORAL
Qty: 90 TABLET | Refills: 0 | OUTPATIENT
Start: 2022-08-04

## 2022-08-05 NOTE — TELEPHONE ENCOUNTER
Patient informed he needs an appointment with Dr. Dupont Early to get medications filled. He states he will call back to schedule. Advised to check his medications and let us know if he needs a short fill before he can get in. He understands.

## 2022-08-08 RX ORDER — FENOFIBRATE 134 MG/1
CAPSULE ORAL
Qty: 90 CAPSULE | Refills: 0 | OUTPATIENT
Start: 2022-08-08

## 2022-08-08 RX ORDER — DAPAGLIFLOZIN 10 MG/1
TABLET, FILM COATED ORAL
Qty: 90 TABLET | Refills: 0 | OUTPATIENT
Start: 2022-08-08

## 2022-08-09 NOTE — TELEPHONE ENCOUNTER
On alternate telephone encounter patient was advised to make appt:  Patient informed he needs an appointment with Dr. Christiano Suarez to get medications filled. He states he will call back to schedule. Advised to check his medications and let us know if he needs a short fill before he can get in. He understands.

## 2022-08-11 RX ORDER — DAPAGLIFLOZIN 10 MG/1
TABLET, FILM COATED ORAL
Qty: 90 TABLET | Refills: 0 | OUTPATIENT
Start: 2022-08-11

## 2022-08-15 NOTE — TELEPHONE ENCOUNTER
On alternate telephone encounter, 8/4/22, patient was advised to make appt:  Patient informed he needs an appointment with Dr. Tami Rios to get medications filled. He states he will call back to schedule. Advised to check his medications and let us know if he needs a short fill before he can get in.   He understands

## 2022-08-15 NOTE — TELEPHONE ENCOUNTER
Patient again called and advised he needs an apointment for Dr. Jessi Benavides to fill his medications. He states, \"we are still deciding on that and I will let you know. \"  Does not want to schedule at this time. Understands his medications will not be filled without a scheduled appt.

## 2023-01-05 ENCOUNTER — OFFICE VISIT (OUTPATIENT)
Dept: PODIATRY | Age: 68
End: 2023-01-05
Payer: MEDICARE

## 2023-01-05 VITALS — HEIGHT: 72 IN | BODY MASS INDEX: 29.26 KG/M2 | WEIGHT: 216 LBS

## 2023-01-05 DIAGNOSIS — E11.42 TYPE 2 DIABETES MELLITUS WITH DIABETIC POLYNEUROPATHY, WITHOUT LONG-TERM CURRENT USE OF INSULIN (HCC): Primary | ICD-10-CM

## 2023-01-05 DIAGNOSIS — Q66.70 PES CAVUS: ICD-10-CM

## 2023-01-05 DIAGNOSIS — B35.1 ONYCHOMYCOSIS: ICD-10-CM

## 2023-01-05 PROCEDURE — G8417 CALC BMI ABV UP PARAM F/U: HCPCS | Performed by: PODIATRIST

## 2023-01-05 PROCEDURE — 99213 OFFICE O/P EST LOW 20 MIN: CPT | Performed by: PODIATRIST

## 2023-01-05 PROCEDURE — 1036F TOBACCO NON-USER: CPT | Performed by: PODIATRIST

## 2023-01-05 PROCEDURE — 3046F HEMOGLOBIN A1C LEVEL >9.0%: CPT | Performed by: PODIATRIST

## 2023-01-05 PROCEDURE — 2022F DILAT RTA XM EVC RTNOPTHY: CPT | Performed by: PODIATRIST

## 2023-01-05 PROCEDURE — G8427 DOCREV CUR MEDS BY ELIG CLIN: HCPCS | Performed by: PODIATRIST

## 2023-01-05 PROCEDURE — G8484 FLU IMMUNIZE NO ADMIN: HCPCS | Performed by: PODIATRIST

## 2023-01-05 PROCEDURE — 11721 DEBRIDE NAIL 6 OR MORE: CPT | Performed by: PODIATRIST

## 2023-01-05 PROCEDURE — 3017F COLORECTAL CA SCREEN DOC REV: CPT | Performed by: PODIATRIST

## 2023-01-05 PROCEDURE — 1123F ACP DISCUSS/DSCN MKR DOCD: CPT | Performed by: PODIATRIST

## 2023-01-05 ASSESSMENT — ENCOUNTER SYMPTOMS
NAUSEA: 0
DIARRHEA: 0
CONSTIPATION: 0
COLOR CHANGE: 0
VOMITING: 0

## 2023-01-05 NOTE — PROGRESS NOTES
[facility]  Return Patient History and Physical    Chief Complaint:   Chief Complaint   Patient presents with    Nail Problem     B/l nail trim, last seen Sarita Selby MD 12/29/22    Diabetes     [facility], Last blood sugar 200       HPI: Gail Damon is a 75 y.o. male who presents to the office today complaining of bilateral heel pain, left worse than right. He is a diabetic  last A1c was 7.2. I have not seen him in about 2 years. Symptoms began 10 year(s) ago. Patient relates pain is Present. Pain is rated 2 out of 10 and is described as waxing and waning. Treatments prior to today's visit include: has custom orthotics, gets new ones every year, at [employer]. Pain after rest, not too bad during the day. Currently denies F/C/N/V. No Known Allergies    No past medical history on file. Prior to Admission medications    Medication Sig Start Date End Date Taking?  Authorizing Provider   fenofibrate micronized (LOFIBRA) 134 MG capsule TAKE 1 CAPSULE BY MOUTH ONCE DAILY IN THE MORNING BEFORE BREAKFAST 5/3/22  Yes Manish Osborn MD   pravastatin (PRAVACHOL) 40 MG tablet Take 1 tablet by mouth once daily 5/3/22  Yes Manish Osborn MD   enalapril (VASOTEC) 10 MG tablet Take 1 tablet by mouth daily 4/27/21  Yes Nikki Burnham MD   glimepiride (AMARYL) 4 MG tablet Take 1 tablet by mouth once daily 4/27/21  Yes Nikki Burnham MD   sAXagliptin (ONGLYZA) 5 MG TABS tablet Take 1 tablet by mouth daily 4/27/21  Yes Nikki Burnham MD   ascorbic acid (VITAMIN C) 500 MG tablet Take 500 mg by mouth 2 times daily   Yes Historical Provider, MD   FARXIGA 10 MG tablet TAKE 1 TABLET BY MOUTH ONCE DAILY IN THE MORNING  Patient not taking: Reported on 1/5/2023 5/3/22   Manish Osborn MD   metFORMIN (GLUCOPHAGE) 1000 MG tablet TAKE 1 TABLET BY MOUTH TWICE DAILY WITH MEALS  Patient not taking: Reported on 1/5/2023 5/3/22   Manish Osborn MD   metoprolol succinate (TOPROL XL) 25 MG extended release tablet Take 1 tablet by mouth once daily  Patient not taking: Reported on 1/5/2023 4/27/21   Tree Soto MD   aspirin EC 81 MG EC tablet Take 1 tablet by mouth daily  Patient not taking: Reported on 1/5/2023 1/5/17   Tree Soto MD       Past Surgical History:   Procedure Laterality Date    FINGER AMPUTATION Left 06/06/2020    index finger by circular saw    SHOULDER ARTHROPLASTY Right 2013    TONSILLECTOMY  1964       Family History   Problem Relation Age of Onset    Dementia Mother     Diabetes Mother     Lung Cancer Brother        Social History     Tobacco Use    Smoking status: Never    Smokeless tobacco: Never   Substance Use Topics    Alcohol use: Yes     Alcohol/week: 0.0 standard drinks     Comment: very rarely       Review of Systems   Constitutional:  Negative for chills, diaphoresis, fatigue, fever and unexpected weight change. Cardiovascular:  Negative for leg swelling. Gastrointestinal:  Negative for constipation, diarrhea, nausea and vomiting. Musculoskeletal:  Positive for gait problem. Negative for arthralgias and joint swelling. Skin:  Negative for color change, pallor, rash and wound. Neurological:  Negative for weakness and numbness. Lower Extremity Physical Examination:     Vitals: There were no vitals filed for this visit. General: AAO x 3 in NAD.      Vascular: DP and PT pulses palpable 2/4, Bilateral.  CFT <3 seconds, Bilateral.  Hair growth absent to the level of the digits, Bilateral.  Edema absent, Bilateral.  Varicosities absent, Bilateral. Erythema absent, Bilateral    Neurological:  Sensation diminshed to light touch to level of digits, Bilateral.    Musculoskeletal: Muscle strength 5/5, Bilateral.  Pain present upon palpation of central heel, medial calcaneal tubercle, Bilateral. increased medial longitudinal arch, Bilateral.  Ankle ROM normal,Bilateral.  1st MPJ ROM normal, Bilateral.      Integument: Warm, dry, supple, Bilateral.  Open lesion absent, Bilateral.      Sites of Onychomycosis Involvement (Check affected area)  [x] [x] [x] [x] [x] [x] [x] [x] [x] [x]  5 4 3 2 1 1 2 3 4 5                          Right                                        Left    Thickness  [x] [x] [x] [x] [x] [x] [x] [x] [x] [x]  5 4 3 2 1 1 2 3 4 5                         Right                                        Left    Dystrophic Changes                                                                 [x] [x] [x] [x] [x] [x] [x] [x] [x] [x]  5 4 3 2 1 1 2 3 4 5                         Right                                        Left    Color                                                                  [x] [x] [x] [x] [x] [x] [x] [x] [x] [x]  5 4 3 2 1 1 2 3 4 5                          Right                                        Left    Incurvation/Ingrowin                                                                   [] [] [] [] [] [] [] [] [] []  5 4 3 2 1 1 2 3 4 5                         Right                                        Left    Inflammation/Pain                                                                   [x] [x] [x] [x] [x] [x] [x] [x] [x] [x]  5 4 3 2 1 1 2 3 4 5                         Right                                        Left   Radiographs: 3 views right Foot:  Large plantar calcaneal exostosis. High medial arch height. No acute pathology. Radiographs: 3 views left Foot:   Large plantar calcaneal exostosis. High medial arch height. No acute pathology. Asessment: Patient is a 75 y.o. male with:   1. Type 2 diabetes mellitus with diabetic polyneuropathy, without long-term current use of insulin ([address]    2. Pes cavus    3. Onychomycosis          Plan: Pt was evaluated and examined. Patient was given personalized discharge instructions. Nails 1-10 were debrided sharply in length and thickness with a nipper and , without incident. Pt will follow up in 4 months or sooner if any problems arise.  Diagnosis was discussed with the pt and all of their questions were answered in detail. Proper foot hygiene and care was discussed with the pt. Informed patient on proper diabetic foot care and importance of tight glycemic control. Patient to check feet daily and contact the office with any questions/problems/concerns. Other comorbidity noted and will be managed by PCP. Diabetic foot examination performed this visit. The exam included neurological sensory exam, a 10-g monofilament and pinprick sensation, vibration using a 128-Hz tuning fork, ankle reflexes, visual skin inspection, vascular exam including assessment of pedal pulses, orthopedic exam for deformities, and shoe inspection. Increased risk factors noted on the diabetic foot exam include decreased sensory exam and peripheral neuropathy. Shoegear inspected and found to be appropriate size and wear. Diabetic shoes and insoles: This patient would benefit from extra depth diabetic shoes and custom inserts. I feel he/she qualifies due to the above performed physical exam and diagnosis. I will do the appropriate paperwork and send it to their primary care physician. Then the patient will be measured for shoes and custom inserts to properly off load and protect his/her feet. Orders Placed This Encounter   Procedures    HM DIABETES FOOT EXAM    ID DEBRIDEMENT NAIL ANY METHOD 6/>       No orders of the defined types were placed in this encounter. RTC in as needed.     1/5/2023

## 2023-04-04 ENCOUNTER — APPOINTMENT (OUTPATIENT)
Dept: GENERAL RADIOLOGY | Age: 68
End: 2023-04-04
Payer: MEDICARE

## 2023-04-04 ENCOUNTER — HOSPITAL ENCOUNTER (INPATIENT)
Age: 68
LOS: 4 days | Discharge: NURSING, REHAB OR LONG-TERM CARE | End: 2023-04-08
Attending: EMERGENCY MEDICINE | Admitting: INTERNAL MEDICINE
Payer: MEDICARE

## 2023-04-04 ENCOUNTER — APPOINTMENT (OUTPATIENT)
Dept: CT IMAGING | Age: 68
End: 2023-04-04
Payer: MEDICARE
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
## 2023-04-04 DIAGNOSIS — E72.20 HYPERAMMONEMIA (HCC): ICD-10-CM

## 2023-04-04 DIAGNOSIS — R41.82 ALTERED MENTAL STATUS, UNSPECIFIED ALTERED MENTAL STATUS TYPE: Primary | ICD-10-CM

## 2023-04-04 PROBLEM — Z76.82 AWAITING LIVER TRANSPLANT: Status: ACTIVE | Noted: 2023-04-04

## 2023-04-04 PROBLEM — K75.81 LIVER CIRRHOSIS SECONDARY TO NASH (HCC): Status: ACTIVE | Noted: 2023-04-04

## 2023-04-04 PROBLEM — R18.8 ABDOMINAL ASCITES: Status: ACTIVE | Noted: 2023-04-04

## 2023-04-04 PROBLEM — N28.9 RENAL INSUFFICIENCY: Status: ACTIVE | Noted: 2023-04-04

## 2023-04-04 PROBLEM — I51.7 LVH (LEFT VENTRICULAR HYPERTROPHY): Status: ACTIVE | Noted: 2023-04-04

## 2023-04-04 PROBLEM — K76.82 HEPATIC ENCEPHALOPATHY (HCC): Status: ACTIVE | Noted: 2023-04-04

## 2023-04-04 PROBLEM — K72.90 HEPATIC INSUFFICIENCY (HCC): Status: ACTIVE | Noted: 2023-04-04

## 2023-04-04 PROBLEM — K59.00 CONSTIPATION: Status: ACTIVE | Noted: 2023-04-04

## 2023-04-04 PROBLEM — E11.9 TYPE 2 DIABETES MELLITUS, WITH LONG-TERM CURRENT USE OF INSULIN (HCC): Status: ACTIVE | Noted: 2023-04-04

## 2023-04-04 PROBLEM — K76.6 PORTAL HYPERTENSIVE GASTROPATHY (HCC): Status: ACTIVE | Noted: 2023-04-04

## 2023-04-04 PROBLEM — R77.8 ELEVATED TROPONIN: Status: ACTIVE | Noted: 2023-04-04

## 2023-04-04 PROBLEM — D73.1 HYPERSPLENISM: Status: ACTIVE | Noted: 2023-04-04

## 2023-04-04 PROBLEM — E06.3 HYPOTHYROIDISM DUE TO HASHIMOTO'S THYROIDITIS: Status: ACTIVE | Noted: 2023-04-04

## 2023-04-04 PROBLEM — R18.8 CIRRHOSIS OF LIVER WITH ASCITES (HCC): Status: ACTIVE | Noted: 2023-04-04

## 2023-04-04 PROBLEM — E78.5 HLD (HYPERLIPIDEMIA): Status: ACTIVE | Noted: 2023-04-04

## 2023-04-04 PROBLEM — E03.8 HYPOTHYROIDISM DUE TO HASHIMOTO'S THYROIDITIS: Status: ACTIVE | Noted: 2023-04-04

## 2023-04-04 PROBLEM — K31.89 PORTAL HYPERTENSIVE GASTROPATHY (HCC): Status: ACTIVE | Noted: 2023-04-04

## 2023-04-04 PROBLEM — N40.0 BPH (BENIGN PROSTATIC HYPERPLASIA): Status: ACTIVE | Noted: 2023-04-04

## 2023-04-04 PROBLEM — Z79.4 TYPE 2 DIABETES MELLITUS, WITH LONG-TERM CURRENT USE OF INSULIN (HCC): Status: ACTIVE | Noted: 2023-04-04

## 2023-04-04 PROBLEM — N18.32 STAGE 3B CHRONIC KIDNEY DISEASE (HCC): Status: ACTIVE | Noted: 2023-04-04

## 2023-04-04 PROBLEM — K74.60 CIRRHOSIS OF LIVER WITH ASCITES (HCC): Status: ACTIVE | Noted: 2023-04-04

## 2023-04-04 PROBLEM — I85.00 ESOPHAGEAL VARICES WITHOUT BLEEDING (HCC): Status: ACTIVE | Noted: 2023-04-04

## 2023-04-04 LAB
ABO/RH: NORMAL
ABO/RH: NORMAL
ALBUMIN SERPL-MCNC: 3.1 G/DL (ref 3.5–5.2)
ALBUMIN SERPL-MCNC: 3.1 G/DL (ref 3.5–5.2)
AMMONIA PLAS-SCNC: 94 UMOL/L (ref 16–60)
AMMONIA PLAS-SCNC: 94 UMOL/L (ref 16–60)
ANION GAP SERPL CALCULATED.3IONS-SCNC: 12 MMOL/L (ref 9–17)
ANION GAP SERPL CALCULATED.3IONS-SCNC: 12 MMOL/L (ref 9–17)
ANTIBODY SCREEN: NEGATIVE
ANTIBODY SCREEN: NEGATIVE
ARM BAND NUMBER: NORMAL
ARM BAND NUMBER: NORMAL
BLOOD BANK SPECIMEN: ABNORMAL
BLOOD BANK SPECIMEN: ABNORMAL
BUN SERPL-MCNC: 53 MG/DL (ref 8–23)
BUN SERPL-MCNC: 53 MG/DL (ref 8–23)
CARBOXYHEMOGLOBIN: 1.2 % (ref 0–5)
CARBOXYHEMOGLOBIN: 1.2 % (ref 0–5)
CHLORIDE SERPL-SCNC: 98 MMOL/L (ref 98–107)
CHLORIDE SERPL-SCNC: 98 MMOL/L (ref 98–107)
CK SERPL-CCNC: 47 U/L (ref 39–308)
CK SERPL-CCNC: 47 U/L (ref 39–308)
CO2 SERPL-SCNC: 25 MMOL/L (ref 20–31)
CO2 SERPL-SCNC: 25 MMOL/L (ref 20–31)
CREAT SERPL-MCNC: 2.21 MG/DL (ref 0.7–1.2)
CREAT SERPL-MCNC: 2.21 MG/DL (ref 0.7–1.2)
EST. AVERAGE GLUCOSE BLD GHB EST-MCNC: 134 MG/DL
EST. AVERAGE GLUCOSE BLD GHB EST-MCNC: 134 MG/DL
ETHANOL PERCENT: <0.01 %
ETHANOL PERCENT: <0.01 %
ETHANOL: <10 MG/DL
ETHANOL: <10 MG/DL
EXPIRATION DATE: NORMAL
EXPIRATION DATE: NORMAL
FIO2: ABNORMAL
FIO2: ABNORMAL
GFR SERPL CREATININE-BSD FRML MDRD: 32 ML/MIN/1.73M2
GFR SERPL CREATININE-BSD FRML MDRD: 32 ML/MIN/1.73M2
GLUCOSE BLD-MCNC: 184 MG/DL (ref 75–110)
GLUCOSE BLD-MCNC: 184 MG/DL (ref 75–110)
GLUCOSE SERPL-MCNC: 233 MG/DL (ref 70–99)
GLUCOSE SERPL-MCNC: 233 MG/DL (ref 70–99)
HBA1C MFR BLD: 6.3 % (ref 4–6)
HBA1C MFR BLD: 6.3 % (ref 4–6)
HCG QUALITATIVE: ABNORMAL
HCG QUALITATIVE: ABNORMAL
HCO3 VENOUS: 25.4 MMOL/L (ref 24–30)
HCO3 VENOUS: 25.4 MMOL/L (ref 24–30)
HCT VFR BLD AUTO: 34.4 % (ref 40.7–50.3)
HCT VFR BLD AUTO: 34.4 % (ref 40.7–50.3)
HGB BLD-MCNC: 11.6 G/DL (ref 13–17)
HGB BLD-MCNC: 11.6 G/DL (ref 13–17)
INR PPP: 1.2
INR PPP: 1.2
MCH RBC QN AUTO: 33.3 PG (ref 25.2–33.5)
MCH RBC QN AUTO: 33.3 PG (ref 25.2–33.5)
MCHC RBC AUTO-ENTMCNC: 33.7 G/DL (ref 28.4–34.8)
MCHC RBC AUTO-ENTMCNC: 33.7 G/DL (ref 28.4–34.8)
MCV RBC AUTO: 98.9 FL (ref 82.6–102.9)
MCV RBC AUTO: 98.9 FL (ref 82.6–102.9)
MYOGLOBIN SERPL-MCNC: 83 NG/ML (ref 28–72)
MYOGLOBIN SERPL-MCNC: 83 NG/ML (ref 28–72)
NRBC AUTOMATED: 0 PER 100 WBC
NRBC AUTOMATED: 0 PER 100 WBC
O2 SAT, VEN: 44.3 % (ref 60–85)
O2 SAT, VEN: 44.3 % (ref 60–85)
PARTIAL THROMBOPLASTIN TIME: 28.5 SEC (ref 23–36.5)
PARTIAL THROMBOPLASTIN TIME: 28.5 SEC (ref 23–36.5)
PATIENT TEMP: 37
PATIENT TEMP: 37
PCO2, VEN: 33.8 MM HG (ref 39–55)
PCO2, VEN: 33.8 MM HG (ref 39–55)
PDW BLD-RTO: 13.5 % (ref 11.8–14.4)
PDW BLD-RTO: 13.5 % (ref 11.8–14.4)
PH VENOUS: 7.49 (ref 7.32–7.42)
PH VENOUS: 7.49 (ref 7.32–7.42)
PLATELET # BLD AUTO: 188 K/UL (ref 138–453)
PLATELET # BLD AUTO: 188 K/UL (ref 138–453)
PMV BLD AUTO: 9.2 FL (ref 8.1–13.5)
PMV BLD AUTO: 9.2 FL (ref 8.1–13.5)
PO2, VEN: 25.2 MM HG (ref 30–50)
PO2, VEN: 25.2 MM HG (ref 30–50)
POSITIVE BASE EXCESS, VEN: 2.7 MMOL/L (ref 0–2)
POSITIVE BASE EXCESS, VEN: 2.7 MMOL/L (ref 0–2)
POTASSIUM SERPL-SCNC: 4.5 MMOL/L (ref 3.7–5.3)
POTASSIUM SERPL-SCNC: 4.5 MMOL/L (ref 3.7–5.3)
PROTHROMBIN TIME: 15 SEC (ref 11.7–14.9)
PROTHROMBIN TIME: 15 SEC (ref 11.7–14.9)
RBC # BLD: 3.48 M/UL (ref 4.21–5.77)
RBC # BLD: 3.48 M/UL (ref 4.21–5.77)
SODIUM SERPL-SCNC: 135 MMOL/L (ref 135–144)
SODIUM SERPL-SCNC: 135 MMOL/L (ref 135–144)
T4 FREE SERPL-MCNC: 1.01 NG/DL (ref 0.93–1.7)
T4 FREE SERPL-MCNC: 1.01 NG/DL (ref 0.93–1.7)
TROPONIN I SERPL DL<=0.01 NG/ML-MCNC: 47 NG/L (ref 0–22)
TROPONIN I SERPL DL<=0.01 NG/ML-MCNC: 47 NG/L (ref 0–22)
TROPONIN I SERPL DL<=0.01 NG/ML-MCNC: 49 NG/L (ref 0–22)
TROPONIN I SERPL DL<=0.01 NG/ML-MCNC: 49 NG/L (ref 0–22)
TSH SERPL-ACNC: 5.93 UIU/ML (ref 0.3–5)
TSH SERPL-ACNC: 5.93 UIU/ML (ref 0.3–5)
VIT B12 SERPL-MCNC: 1437 PG/ML (ref 232–1245)
VIT B12 SERPL-MCNC: 1437 PG/ML (ref 232–1245)
WBC # BLD AUTO: 6.2 K/UL (ref 3.5–11.3)
WBC # BLD AUTO: 6.2 K/UL (ref 3.5–11.3)

## 2023-04-04 PROCEDURE — 86901 BLOOD TYPING SEROLOGIC RH(D): CPT

## 2023-04-04 PROCEDURE — 99223 1ST HOSP IP/OBS HIGH 75: CPT

## 2023-04-04 PROCEDURE — 6810039001 HC L1 TRAUMA PRIORITY

## 2023-04-04 PROCEDURE — 36415 COLL VENOUS BLD VENIPUNCTURE: CPT

## 2023-04-04 PROCEDURE — 72125 CT NECK SPINE W/O DYE: CPT

## 2023-04-04 PROCEDURE — 82040 ASSAY OF SERUM ALBUMIN: CPT

## 2023-04-04 PROCEDURE — 84520 ASSAY OF UREA NITROGEN: CPT

## 2023-04-04 PROCEDURE — 84443 ASSAY THYROID STIM HORMONE: CPT

## 2023-04-04 PROCEDURE — 84484 ASSAY OF TROPONIN QUANT: CPT

## 2023-04-04 PROCEDURE — 6360000002 HC RX W HCPCS

## 2023-04-04 PROCEDURE — 84703 CHORIONIC GONADOTROPIN ASSAY: CPT

## 2023-04-04 PROCEDURE — 99223 1ST HOSP IP/OBS HIGH 75: CPT | Performed by: SURGERY

## 2023-04-04 PROCEDURE — 94761 N-INVAS EAR/PLS OXIMETRY MLT: CPT

## 2023-04-04 PROCEDURE — 82947 ASSAY GLUCOSE BLOOD QUANT: CPT

## 2023-04-04 PROCEDURE — 74176 CT ABD & PELVIS W/O CONTRAST: CPT

## 2023-04-04 PROCEDURE — 80051 ELECTROLYTE PANEL: CPT

## 2023-04-04 PROCEDURE — 70450 CT HEAD/BRAIN W/O DYE: CPT

## 2023-04-04 PROCEDURE — 2580000003 HC RX 258

## 2023-04-04 PROCEDURE — A4216 STERILE WATER/SALINE, 10 ML: HCPCS

## 2023-04-04 PROCEDURE — 83874 ASSAY OF MYOGLOBIN: CPT

## 2023-04-04 PROCEDURE — 86850 RBC ANTIBODY SCREEN: CPT

## 2023-04-04 PROCEDURE — 83036 HEMOGLOBIN GLYCOSYLATED A1C: CPT

## 2023-04-04 PROCEDURE — 85610 PROTHROMBIN TIME: CPT

## 2023-04-04 PROCEDURE — 85730 THROMBOPLASTIN TIME PARTIAL: CPT

## 2023-04-04 PROCEDURE — 82805 BLOOD GASES W/O2 SATURATION: CPT

## 2023-04-04 PROCEDURE — G0480 DRUG TEST DEF 1-7 CLASSES: HCPCS

## 2023-04-04 PROCEDURE — 3209999900 CT LUMBAR SPINE TRAUMA RECONSTRUCTION

## 2023-04-04 PROCEDURE — 84439 ASSAY OF FREE THYROXINE: CPT

## 2023-04-04 PROCEDURE — 85027 COMPLETE CBC AUTOMATED: CPT

## 2023-04-04 PROCEDURE — 86900 BLOOD TYPING SEROLOGIC ABO: CPT

## 2023-04-04 PROCEDURE — 82140 ASSAY OF AMMONIA: CPT

## 2023-04-04 PROCEDURE — 2060000000 HC ICU INTERMEDIATE R&B

## 2023-04-04 PROCEDURE — 82550 ASSAY OF CK (CPK): CPT

## 2023-04-04 PROCEDURE — 82565 ASSAY OF CREATININE: CPT

## 2023-04-04 PROCEDURE — 73562 X-RAY EXAM OF KNEE 3: CPT

## 2023-04-04 PROCEDURE — 6370000000 HC RX 637 (ALT 250 FOR IP)

## 2023-04-04 PROCEDURE — 99285 EMERGENCY DEPT VISIT HI MDM: CPT

## 2023-04-04 PROCEDURE — 1200000000 HC SEMI PRIVATE

## 2023-04-04 PROCEDURE — C9113 INJ PANTOPRAZOLE SODIUM, VIA: HCPCS

## 2023-04-04 PROCEDURE — 82607 VITAMIN B-12: CPT

## 2023-04-04 PROCEDURE — 3209999900 CT THORACIC SPINE TRAUMA RECONSTRUCTION

## 2023-04-04 RX ORDER — ACETAMINOPHEN 325 MG/1
650 TABLET ORAL EVERY 6 HOURS PRN
Status: DISCONTINUED | OUTPATIENT
Start: 2023-04-04 | End: 2023-04-08 | Stop reason: HOSPADM

## 2023-04-04 RX ORDER — POLYETHYLENE GLYCOL 3350 17 G/17G
17 POWDER, FOR SOLUTION ORAL DAILY PRN
Status: DISCONTINUED | OUTPATIENT
Start: 2023-04-04 | End: 2023-04-08 | Stop reason: HOSPADM

## 2023-04-04 RX ORDER — LACTULOSE 10 G/15ML
20 SOLUTION ORAL 3 TIMES DAILY
Status: DISCONTINUED | OUTPATIENT
Start: 2023-04-04 | End: 2023-04-05

## 2023-04-04 RX ORDER — ONDANSETRON 2 MG/ML
4 INJECTION INTRAMUSCULAR; INTRAVENOUS EVERY 6 HOURS PRN
Status: DISCONTINUED | OUTPATIENT
Start: 2023-04-04 | End: 2023-04-08 | Stop reason: HOSPADM

## 2023-04-04 RX ORDER — MIDODRINE HYDROCHLORIDE 5 MG/1
2.5 TABLET ORAL 3 TIMES DAILY PRN
Status: DISCONTINUED | OUTPATIENT
Start: 2023-04-04 | End: 2023-04-06

## 2023-04-04 RX ORDER — SODIUM CHLORIDE 9 MG/ML
INJECTION, SOLUTION INTRAVENOUS PRN
Status: DISCONTINUED | OUTPATIENT
Start: 2023-04-04 | End: 2023-04-08 | Stop reason: HOSPADM

## 2023-04-04 RX ORDER — ACETAMINOPHEN 650 MG/1
650 SUPPOSITORY RECTAL EVERY 6 HOURS PRN
Status: DISCONTINUED | OUTPATIENT
Start: 2023-04-04 | End: 2023-04-08 | Stop reason: HOSPADM

## 2023-04-04 RX ORDER — SODIUM PHOSPHATE, DIBASIC AND SODIUM PHOSPHATE, MONOBASIC 7; 19 G/133ML; G/133ML
1 ENEMA RECTAL ONCE
Status: DISCONTINUED | OUTPATIENT
Start: 2023-04-04 | End: 2023-04-05

## 2023-04-04 RX ORDER — INSULIN LISPRO 100 [IU]/ML
0-8 INJECTION, SOLUTION INTRAVENOUS; SUBCUTANEOUS
Status: DISCONTINUED | OUTPATIENT
Start: 2023-04-04 | End: 2023-04-08 | Stop reason: HOSPADM

## 2023-04-04 RX ORDER — ONDANSETRON 4 MG/1
4 TABLET, ORALLY DISINTEGRATING ORAL EVERY 8 HOURS PRN
Status: DISCONTINUED | OUTPATIENT
Start: 2023-04-04 | End: 2023-04-08 | Stop reason: HOSPADM

## 2023-04-04 RX ORDER — HALOPERIDOL 5 MG/ML
2 INJECTION INTRAMUSCULAR EVERY 6 HOURS PRN
Status: DISCONTINUED | OUTPATIENT
Start: 2023-04-04 | End: 2023-04-06

## 2023-04-04 RX ORDER — INSULIN LISPRO 100 [IU]/ML
0-4 INJECTION, SOLUTION INTRAVENOUS; SUBCUTANEOUS NIGHTLY
Status: DISCONTINUED | OUTPATIENT
Start: 2023-04-04 | End: 2023-04-08 | Stop reason: HOSPADM

## 2023-04-04 RX ORDER — SODIUM CHLORIDE 0.9 % (FLUSH) 0.9 %
5-40 SYRINGE (ML) INJECTION EVERY 12 HOURS SCHEDULED
Status: DISCONTINUED | OUTPATIENT
Start: 2023-04-04 | End: 2023-04-08 | Stop reason: HOSPADM

## 2023-04-04 RX ORDER — DEXTROSE MONOHYDRATE 100 MG/ML
INJECTION, SOLUTION INTRAVENOUS CONTINUOUS PRN
Status: DISCONTINUED | OUTPATIENT
Start: 2023-04-04 | End: 2023-04-08 | Stop reason: HOSPADM

## 2023-04-04 RX ORDER — HEPARIN SODIUM 5000 [USP'U]/ML
5000 INJECTION, SOLUTION INTRAVENOUS; SUBCUTANEOUS EVERY 8 HOURS SCHEDULED
Status: DISCONTINUED | OUTPATIENT
Start: 2023-04-04 | End: 2023-04-08 | Stop reason: HOSPADM

## 2023-04-04 RX ORDER — SODIUM CHLORIDE 0.9 % (FLUSH) 0.9 %
5-40 SYRINGE (ML) INJECTION PRN
Status: DISCONTINUED | OUTPATIENT
Start: 2023-04-04 | End: 2023-04-08 | Stop reason: HOSPADM

## 2023-04-04 RX ADMIN — LACTULOSE 20 G: 20 SOLUTION ORAL at 21:47

## 2023-04-04 RX ADMIN — HALOPERIDOL LACTATE 2 MG: 5 INJECTION, SOLUTION INTRAMUSCULAR at 17:59

## 2023-04-04 RX ADMIN — RIFAXIMIN 550 MG: 550 TABLET ORAL at 21:47

## 2023-04-04 RX ADMIN — SODIUM CHLORIDE, PRESERVATIVE FREE 10 ML: 5 INJECTION INTRAVENOUS at 21:47

## 2023-04-04 RX ADMIN — SODIUM CHLORIDE, PRESERVATIVE FREE 40 MG: 5 INJECTION INTRAVENOUS at 17:24

## 2023-04-04 ASSESSMENT — ENCOUNTER SYMPTOMS
VOMITING: 0
NAUSEA: 0
COUGH: 0
ABDOMINAL PAIN: 0
CONSTIPATION: 0
SHORTNESS OF BREATH: 0
DIARRHEA: 0
ABDOMINAL DISTENTION: 0

## 2023-04-04 NOTE — H&P
TRAUMA H&P/CONSULT    PATIENT NAME: Cp Trauma [place]  YOB: 1881  MEDICAL RECORD NO. [id number]   DATE: 4/4/2023  PRIMARY CARE PHYSICIAN: No primary care provider on file. PATIENT EVALUATED AT THE REQUEST OF : Carly PASTRANA   []Trauma Alert     [x] Trauma Priority     []Trauma Consult. There is no problem list on file for this patient. IMPRESSION AND PLAN:       Altered mental status   Liver failure   Ascites   Elevated ammonia level   CT scans show no acute traumatic injury    Elevated creat   Baseline appears to be 1.9-2.1    If intracranial hemorrhage is present, is it a:  [] BIG 1  [] BIG 2  [] BIG 3  If chest wall injury: Rib score___    CONSULT SERVICES    [] Neurosurgery     [] Orthopedic Surgery    [] Cardiothoracic     [] Facial Trauma    [] Plastic Surgery (Burn)    [] Pediatric Surgery     [] Internal Medicine    [] Pulmonary Medicine    [] Geriatrics    [] Other:        HISTORY:     Chief Complaint:  \"none\"    GENERAL DATA  Patient information was obtained from patient, spouse/SO, EMS personnel, and past medical records. History/Exam limitations: confusion. Injury Date: 4/3/2023   Approximate Injury Time: morning       Transport mode:   []Ambulance      [] Helicopter     []Car       [] Other  Referring Hospital:     [facility]   Location (e.g., home, farm, industry, street): unknown  Specific Details of Location (e.g., bedroom, kitchen, garage, highway): unknown    MECHANISM OF INJURY    [] Motor Vehicle Collision   Specific vehicle type involved (e.g., sedan, minivan, SUV, pickup truck):      Type of collision  [] Single Vehicle Collision  []Multiple Vehicle Collision  [] unknown collision type  Collision with (e.g., type of vehicle, building, barn, ditch, tree):     Mechanism considerations  [] Fatality in Same Vehicle      []Ejected       []Rollover          []Extricated    Internal Compartment   []                      []Passenger:
04/04/2023 for details regarding intrathoracic, intra-abdominal, and intrapelvic findings. ASSESSMENT & PLAN     ASSESSMENT / PLAN:     IMPRESSION    This is a 75 y.o. male who presented with AMS. Found to have hyperammonemia and acute metabolic encephalopathy. Admitted for management of decompensated liver cirrhosis and paracentesis. Principal Problem:    Hepatic encephalopathy ([address]  Active Problems:    Fall    Altered mental status    Liver cirrhosis secondary to BAUTISTA ([address]    Abdominal ascites    Awaiting liver transplant    Stage 3b chronic kidney disease (HCC)    Type 2 diabetes mellitus, with long-term current use of insulin (HCC)    Hypersplenism    Portal hypertensive gastropathy (HCC)    Esophageal varices without bleeding (HCC)    Hypothyroidism due to Hashimoto's thyroiditis    HLD (hyperlipidemia)    BPH (benign prostatic hyperplasia)    LVH (left ventricular hypertrophy)    Hyperammonemia (HCC)    Elevated troponin    Constipation  Resolved Problems:    * No resolved hospital problems.  *     #Acute metabolic encephalopathy w/ hyperammonemia, likely d/t hepatic encephalopathy, in the setting of liver cirrhosis 2/2 BAUTISTA w/ portal hypertensive gastropathy, esophageal varices w/o bleeding, and large volume ascites, in acute decompensation  - Patient is on the transplant list  - Home meds per chart review: Bumex 1mg qam and 0.5mg qhs, spironolactone 50mg qd, lactulose 10mg tid, rifaximin 550mg bid, and coreg 6.25mg for variceal bleed ppx  - Restart lactulose, titrate to 2-3 loose BM a day  - Restart Rifaximin  - Will confirm patient's diuretics with pharmacy and plan to restart tomorrow  - Hold Coreg while inpatient  - Continue protonix 40mg qd  - Haldol 2mg q6h/prn ordered for agitation  - Normally gets therapeutic paracentesis done once weekly, last procedure was 3/28/23 w/ drainage of 9.1L, unknown if albumin was given  - IR guided paracentesis and fluid analysis ordered to rule out SBP  -

## 2023-04-04 NOTE — ED PROVIDER NOTES
Annette Saxena MD  04/04/23 1147
spine.  2. No acute vertebral body height loss or malalignment within the thoracic  spine. Mild dextroscoliosis of the thoracic spine on coronal reconstructions. Lumbar spine:     1. Mild multilevel degenerative changes in the lumbar spine. 2. No acute vertebral body height loss or malalignment within the lumbar  spine. Mild levoscoliosis of the lumbar spine on coronal reconstructions. Please see CT chest, abdomen, and pelvis report from 04/04/2023 for details  regarding intrathoracic, intra-abdominal, and intrapelvic findings. [JS]   46 CT HEAD WO CONTRAST  IMPRESSION:  No acute intracranial abnormality. No acute cervical spine abnormality. Mild multilevel degenerative disc  disease and hypertrophic changes. [JS]      ED Course User Index  [JS] Tod Necessary, DO     Trauma team does not plan to admit the patient as there are no traumatic injuries. Patient will need admission for altered mental status, he does have elevated ammonia which may be a cause. Patient admitted to internal medicine. PROCEDURES:      CONSULTS:  IP CONSULT TO INTERNAL MEDICINE        FINAL IMPRESSION      1. Altered mental status, unspecified altered mental status type    2. Fall, initial encounter    3. Hyperammonemia ([address]          DISPOSITION / PLAN     DISPOSITION Admitted 04/04/2023 02:59:24 PM      PATIENT REFERRED TO:  No follow-up provider specified.     DISCHARGE MEDICATIONS:  New Prescriptions    No medications on file       Tod Necessary, DO  Emergency Medicine Resident    (Please note that portions of thisnote were completed with a voice recognition program.  Efforts were made to edit the dictations but occasionally words are mis-transcribed.)        Tod Necessary, DO  Resident  04/04/23 9167

## 2023-04-04 NOTE — ED NOTES
Trauma Labs obtained by  at this time.       Labs obtained include:       [x] Trauma Profile    [] Type and Cross     [x] Type and Screen    []ABG      []VBG    [] Cardiac Enzymes    []PFA    []Urinalysis     []JOSELYN    []TEG    []Standard Teg    []Rapid Teg    []Platelet Mapping    []Rapid KARLO Hawkins RN  04/04/23 2107

## 2023-04-04 NOTE — ACP (ADVANCE CARE PLANNING)
Advance Care Planning     Advance Care Planning Inpatient Note  [facility] Department    Today's Date: 4/4/2023  Unit: [facility]  ED        Health Care Decision Makers:       Primary Decision Maker: Lexijean marie Miller - Girlfriend - [phone]    Secondary Decision Maker: Ekta Castillo - Child - [phone]  Summary:    Family provided the advance directive papers.  made copy and put in medical record bin in the ED.     Electronically signed by Jenae Olson, [employer] on 4/4/2023 at 4:26 PM

## 2023-04-04 NOTE — ED NOTES
The following labs were labeled with appropriate pt sticker and tubed to lab:     [] Blue     [x] Lavender   [x] on ice  [] Green/yellow  [] Green/black [] on ice  [] Marta Soulier  [] on ice  [] Yellow  [] Red  [] Type/ Screen  [] ABG  [] VBG    [] COVID-19 swab    [] Rapid  [] PCR  [] Flu swab  [] Peds Viral Panel     [] Urine Sample  [] Fecal Sample  [] Pelvic Cultures  [] Blood Cultures  [] X 2  [] STREP Cultures       Paola Vallecillo RN  04/04/23 4516

## 2023-04-04 NOTE — ED NOTES
ED to inpatient nurses report      Chief Complaint:  Chief Complaint   Patient presents with    Fall     Present to ED from: EMS     MOA:     LOC: Alert to name and place   Mobility:  has not ambulated since arrival, in C-collar   Oxygen Baseline: RA    Current needs required: cardiac monitoring    Pending ED orders: None  Present condition: stable    Pertinent event(s): Pt currently in C-Collar, on full cardiac monitor.        Mental Status:       Psych Assessment:      Vital signs   Vitals:    04/04/23 1149 04/04/23 1153 04/04/23 1156 04/04/23 1215   BP: 126/78 95/61  117/75   Pulse: 77 79     Resp: 11 15     Temp:   98.1 °F (36.7 °C)    TempSrc:   Oral    SpO2: 98% 96%          Vitals:  Patient Vitals for the past 24 hrs:   BP Temp Temp src Pulse Resp SpO2   04/04/23 1215 117/75 -- -- -- -- --   04/04/23 1156 -- 98.1 °F (36.7 °C) Oral -- -- --   04/04/23 1153 95/61 -- -- 79 15 96 %   04/04/23 1149 126/78 -- -- 77 11 98 %   04/04/23 1143 112/77 -- -- -- -- --   04/04/23 1142 97/78 -- -- 76 17 98 %   04/04/23 1138 105/76 -- -- 80 18 97 %   04/04/23 1136 -- 97.7 °F (36.5 °C) Oral -- -- --      Visit Vitals  /75   Pulse 79   Temp 98.1 °F (36.7 °C) (Oral)   Resp 15   SpO2 96%        LDAs:   Peripheral IV 04/04/23 Distal;Right Cephalic (Active)   Site Assessment Clean, dry & intact 04/04/23 1144   Line Status Blood return noted 04/04/23 1144       Peripheral IV 04/04/23 Right Antecubital (Active)       Ambulatory Status:  No data recorded    Diagnosis:  DISPOSITION Decision To Admit 04/04/2023 01:58:19 PM   Final diagnoses:   None        Code Status: [unfilled]     Consults:  []  Hospitalist  Completed  [] yes [] no  []  Medicine  Completed  [] yes [] No  []  Cardiology  Completed  [] yes [] No  []  GI   Completed  [] yes [] No  []  Neurology  Completed  [] yes [] No  []  Nephrology Completed  [] yes [] No  []  Vascular  Completed  [] yes [] No   []  Surgery  Completed  [] yes [] No   []  Urology  Completed  []

## 2023-04-04 NOTE — ED NOTES
Pt arrived to Ed via EMS for new onset confusion after fall yesterday. Pt was seen at [facility] yesterday after a fall and since has had more confusion per significant other. Pt responding to commands on arrival. Has hx of liver cirrhosis Vitals stable. Pt placed on cardiac monitor, C-collar in place.      Marcelo Lawler RN  04/04/23 2331

## 2023-04-05 ENCOUNTER — APPOINTMENT (OUTPATIENT)
Dept: ULTRASOUND IMAGING | Age: 68
End: 2023-04-05
Payer: MEDICARE

## 2023-04-05 LAB
ABSOLUTE EOS #: 0.05 K/UL (ref 0–0.4)
ABSOLUTE EOS #: 0.05 K/UL (ref 0–0.4)
ABSOLUTE IMMATURE GRANULOCYTE: 0 K/UL (ref 0–0.3)
ABSOLUTE IMMATURE GRANULOCYTE: 0 K/UL (ref 0–0.3)
ABSOLUTE LYMPH #: 0.5 K/UL (ref 1–4.8)
ABSOLUTE LYMPH #: 0.5 K/UL (ref 1–4.8)
ABSOLUTE MONO #: 0.05 K/UL (ref 0.1–0.8)
ABSOLUTE MONO #: 0.05 K/UL (ref 0.1–0.8)
ALBUMIN FLUID: 0.9 G/DL
ALBUMIN FLUID: 0.9 G/DL
AMYLASE FLUID: 34 U/L
AMYLASE FLUID: 34 U/L
ANION GAP SERPL CALCULATED.3IONS-SCNC: 16 MMOL/L (ref 9–17)
ANION GAP SERPL CALCULATED.3IONS-SCNC: 16 MMOL/L (ref 9–17)
BASOPHILS # BLD: 0 % (ref 0–2)
BASOPHILS # BLD: 0 % (ref 0–2)
BASOPHILS ABSOLUTE: 0 K/UL (ref 0–0.2)
BASOPHILS ABSOLUTE: 0 K/UL (ref 0–0.2)
BUN SERPL-MCNC: 51 MG/DL (ref 8–23)
BUN SERPL-MCNC: 51 MG/DL (ref 8–23)
CALCIUM SERPL-MCNC: 9.2 MG/DL (ref 8.6–10.4)
CALCIUM SERPL-MCNC: 9.2 MG/DL (ref 8.6–10.4)
CASE NUMBER:: NORMAL
CASE NUMBER:: NORMAL
CHLORIDE SERPL-SCNC: 101 MMOL/L (ref 98–107)
CHLORIDE SERPL-SCNC: 101 MMOL/L (ref 98–107)
CO2 SERPL-SCNC: 22 MMOL/L (ref 20–31)
CO2 SERPL-SCNC: 22 MMOL/L (ref 20–31)
CREAT SERPL-MCNC: 2.02 MG/DL (ref 0.7–1.2)
CREAT SERPL-MCNC: 2.02 MG/DL (ref 0.7–1.2)
EOSINOPHILS RELATIVE PERCENT: 1 % (ref 1–4)
EOSINOPHILS RELATIVE PERCENT: 1 % (ref 1–4)
GFR SERPL CREATININE-BSD FRML MDRD: 35 ML/MIN/1.73M2
GFR SERPL CREATININE-BSD FRML MDRD: 35 ML/MIN/1.73M2
GLUCOSE BLD-MCNC: 101 MG/DL (ref 75–110)
GLUCOSE BLD-MCNC: 101 MG/DL (ref 75–110)
GLUCOSE BLD-MCNC: 131 MG/DL (ref 75–110)
GLUCOSE BLD-MCNC: 131 MG/DL (ref 75–110)
GLUCOSE BLD-MCNC: 161 MG/DL (ref 75–110)
GLUCOSE BLD-MCNC: 161 MG/DL (ref 75–110)
GLUCOSE BLD-MCNC: 175 MG/DL (ref 75–110)
GLUCOSE BLD-MCNC: 175 MG/DL (ref 75–110)
GLUCOSE SERPL-MCNC: 175 MG/DL (ref 70–99)
GLUCOSE SERPL-MCNC: 175 MG/DL (ref 70–99)
HCT VFR BLD AUTO: 31.1 % (ref 40.7–50.3)
HCT VFR BLD AUTO: 31.1 % (ref 40.7–50.3)
HGB BLD-MCNC: 10.5 G/DL (ref 13–17)
HGB BLD-MCNC: 10.5 G/DL (ref 13–17)
IMMATURE GRANULOCYTES: 0 %
IMMATURE GRANULOCYTES: 0 %
LYMPHOCYTES # BLD: 10 % (ref 24–44)
LYMPHOCYTES # BLD: 10 % (ref 24–44)
MCH RBC QN AUTO: 33.2 PG (ref 25.2–33.5)
MCH RBC QN AUTO: 33.2 PG (ref 25.2–33.5)
MCHC RBC AUTO-ENTMCNC: 33.8 G/DL (ref 28.4–34.8)
MCHC RBC AUTO-ENTMCNC: 33.8 G/DL (ref 28.4–34.8)
MCV RBC AUTO: 98.4 FL (ref 82.6–102.9)
MCV RBC AUTO: 98.4 FL (ref 82.6–102.9)
MICROORGANISM/AGENT SPEC: NORMAL
MICROORGANISM/AGENT SPEC: NORMAL
MONOCYTES # BLD: 1 % (ref 1–7)
MONOCYTES # BLD: 1 % (ref 1–7)
MORPHOLOGY: NORMAL
MORPHOLOGY: NORMAL
NRBC AUTOMATED: 0 PER 100 WBC
NRBC AUTOMATED: 0 PER 100 WBC
PDW BLD-RTO: 13.3 % (ref 11.8–14.4)
PDW BLD-RTO: 13.3 % (ref 11.8–14.4)
PLATELET # BLD AUTO: 163 K/UL (ref 138–453)
PLATELET # BLD AUTO: 163 K/UL (ref 138–453)
PMV BLD AUTO: 9.3 FL (ref 8.1–13.5)
PMV BLD AUTO: 9.3 FL (ref 8.1–13.5)
POTASSIUM SERPL-SCNC: 3.8 MMOL/L (ref 3.7–5.3)
POTASSIUM SERPL-SCNC: 3.8 MMOL/L (ref 3.7–5.3)
RBC # BLD: 3.16 M/UL (ref 4.21–5.77)
RBC # BLD: 3.16 M/UL (ref 4.21–5.77)
SEG NEUTROPHILS: 88 % (ref 36–66)
SEG NEUTROPHILS: 88 % (ref 36–66)
SEGMENTED NEUTROPHILS ABSOLUTE COUNT: 4.4 K/UL (ref 1.8–7.7)
SEGMENTED NEUTROPHILS ABSOLUTE COUNT: 4.4 K/UL (ref 1.8–7.7)
SODIUM SERPL-SCNC: 139 MMOL/L (ref 135–144)
SODIUM SERPL-SCNC: 139 MMOL/L (ref 135–144)
SPECIMEN DESCRIPTION: NORMAL
SPECIMEN DESCRIPTION: NORMAL
SPECIMEN TYPE: NORMAL
TROPONIN I SERPL DL<=0.01 NG/ML-MCNC: 48 NG/L (ref 0–22)
TROPONIN I SERPL DL<=0.01 NG/ML-MCNC: 48 NG/L (ref 0–22)
WBC # BLD AUTO: 5 K/UL (ref 3.5–11.3)
WBC # BLD AUTO: 5 K/UL (ref 3.5–11.3)

## 2023-04-05 PROCEDURE — 87070 CULTURE OTHR SPECIMN AEROBIC: CPT

## 2023-04-05 PROCEDURE — 89051 BODY FLUID CELL COUNT: CPT

## 2023-04-05 PROCEDURE — 36415 COLL VENOUS BLD VENIPUNCTURE: CPT

## 2023-04-05 PROCEDURE — 87205 SMEAR GRAM STAIN: CPT

## 2023-04-05 PROCEDURE — 80048 BASIC METABOLIC PNL TOTAL CA: CPT

## 2023-04-05 PROCEDURE — 2580000003 HC RX 258

## 2023-04-05 PROCEDURE — P9047 ALBUMIN (HUMAN), 25%, 50ML: HCPCS | Performed by: INTERNAL MEDICINE

## 2023-04-05 PROCEDURE — 82947 ASSAY GLUCOSE BLOOD QUANT: CPT

## 2023-04-05 PROCEDURE — 1200000000 HC SEMI PRIVATE

## 2023-04-05 PROCEDURE — C9113 INJ PANTOPRAZOLE SODIUM, VIA: HCPCS

## 2023-04-05 PROCEDURE — 99232 SBSQ HOSP IP/OBS MODERATE 35: CPT

## 2023-04-05 PROCEDURE — 82150 ASSAY OF AMYLASE: CPT

## 2023-04-05 PROCEDURE — 82042 OTHER SOURCE ALBUMIN QUAN EA: CPT

## 2023-04-05 PROCEDURE — 6360000002 HC RX W HCPCS

## 2023-04-05 PROCEDURE — 88305 TISSUE EXAM BY PATHOLOGIST: CPT

## 2023-04-05 PROCEDURE — 85025 COMPLETE CBC W/AUTO DIFF WBC: CPT

## 2023-04-05 PROCEDURE — 0W9G3ZZ DRAINAGE OF PERITONEAL CAVITY, PERCUTANEOUS APPROACH: ICD-10-PCS | Performed by: RADIOLOGY

## 2023-04-05 PROCEDURE — 2709999900 US GUIDED PARACENTESIS

## 2023-04-05 PROCEDURE — 2060000000 HC ICU INTERMEDIATE R&B

## 2023-04-05 PROCEDURE — 94761 N-INVAS EAR/PLS OXIMETRY MLT: CPT

## 2023-04-05 PROCEDURE — 88112 CYTOPATH CELL ENHANCE TECH: CPT

## 2023-04-05 PROCEDURE — 87075 CULTR BACTERIA EXCEPT BLOOD: CPT

## 2023-04-05 PROCEDURE — 84484 ASSAY OF TROPONIN QUANT: CPT

## 2023-04-05 PROCEDURE — 6370000000 HC RX 637 (ALT 250 FOR IP)

## 2023-04-05 PROCEDURE — 6360000002 HC RX W HCPCS: Performed by: INTERNAL MEDICINE

## 2023-04-05 RX ORDER — ALBUMIN (HUMAN) 12.5 G/50ML
50 SOLUTION INTRAVENOUS ONCE
Status: COMPLETED | OUTPATIENT
Start: 2023-04-05 | End: 2023-04-05

## 2023-04-05 RX ORDER — SODIUM PHOSPHATE, DIBASIC AND SODIUM PHOSPHATE, MONOBASIC 7; 19 G/133ML; G/133ML
1 ENEMA RECTAL ONCE
Status: DISCONTINUED | OUTPATIENT
Start: 2023-04-05 | End: 2023-04-05

## 2023-04-05 RX ORDER — LACTULOSE 10 G/15ML
30 SOLUTION ORAL 3 TIMES DAILY
Status: DISCONTINUED | OUTPATIENT
Start: 2023-04-05 | End: 2023-04-08 | Stop reason: HOSPADM

## 2023-04-05 RX ADMIN — SODIUM CHLORIDE, PRESERVATIVE FREE 40 MG: 5 INJECTION INTRAVENOUS at 12:54

## 2023-04-05 RX ADMIN — SODIUM CHLORIDE, PRESERVATIVE FREE 10 ML: 5 INJECTION INTRAVENOUS at 21:00

## 2023-04-05 RX ADMIN — LACTULOSE 30 G: 20 SOLUTION ORAL at 14:19

## 2023-04-05 RX ADMIN — RIFAXIMIN 550 MG: 550 TABLET ORAL at 21:47

## 2023-04-05 RX ADMIN — HEPARIN SODIUM 5000 UNITS: 5000 INJECTION INTRAVENOUS; SUBCUTANEOUS at 21:47

## 2023-04-05 RX ADMIN — HEPARIN SODIUM 5000 UNITS: 5000 INJECTION INTRAVENOUS; SUBCUTANEOUS at 14:19

## 2023-04-05 RX ADMIN — SODIUM CHLORIDE, PRESERVATIVE FREE 10 ML: 5 INJECTION INTRAVENOUS at 09:00

## 2023-04-05 RX ADMIN — RIFAXIMIN 550 MG: 550 TABLET ORAL at 12:54

## 2023-04-05 RX ADMIN — LACTULOSE 30 G: 20 SOLUTION ORAL at 21:30

## 2023-04-05 RX ADMIN — ALBUMIN (HUMAN) 50 G: 0.25 INJECTION, SOLUTION INTRAVENOUS at 10:00

## 2023-04-06 LAB
ABSOLUTE EOS #: 0.06 K/UL (ref 0–0.44)
ABSOLUTE EOS #: 0.06 K/UL (ref 0–0.44)
ABSOLUTE IMMATURE GRANULOCYTE: <0.03 K/UL (ref 0–0.3)
ABSOLUTE IMMATURE GRANULOCYTE: <0.03 K/UL (ref 0–0.3)
ABSOLUTE LYMPH #: 0.72 K/UL (ref 1.1–3.7)
ABSOLUTE LYMPH #: 0.72 K/UL (ref 1.1–3.7)
ABSOLUTE MONO #: 0.55 K/UL (ref 0.1–1.2)
ABSOLUTE MONO #: 0.55 K/UL (ref 0.1–1.2)
ANION GAP SERPL CALCULATED.3IONS-SCNC: 13 MMOL/L (ref 9–17)
ANION GAP SERPL CALCULATED.3IONS-SCNC: 13 MMOL/L (ref 9–17)
BASOPHILS # BLD: 0 % (ref 0–2)
BASOPHILS # BLD: 0 % (ref 0–2)
BASOPHILS ABSOLUTE: <0.03 K/UL (ref 0–0.2)
BASOPHILS ABSOLUTE: <0.03 K/UL (ref 0–0.2)
BILIRUBIN URINE: NEGATIVE
BILIRUBIN URINE: NEGATIVE
BUN SERPL-MCNC: 47 MG/DL (ref 8–23)
BUN SERPL-MCNC: 47 MG/DL (ref 8–23)
CALCIUM SERPL-MCNC: 9 MG/DL (ref 8.6–10.4)
CALCIUM SERPL-MCNC: 9 MG/DL (ref 8.6–10.4)
CHLORIDE SERPL-SCNC: 103 MMOL/L (ref 98–107)
CHLORIDE SERPL-SCNC: 103 MMOL/L (ref 98–107)
CO2 SERPL-SCNC: 23 MMOL/L (ref 20–31)
CO2 SERPL-SCNC: 23 MMOL/L (ref 20–31)
COLOR: ABNORMAL
COLOR: ABNORMAL
COMMENT UA: ABNORMAL
COMMENT UA: ABNORMAL
CREAT SERPL-MCNC: 1.89 MG/DL (ref 0.7–1.2)
CREAT SERPL-MCNC: 1.89 MG/DL (ref 0.7–1.2)
EOSINOPHILS RELATIVE PERCENT: 1 % (ref 1–4)
EOSINOPHILS RELATIVE PERCENT: 1 % (ref 1–4)
GFR SERPL CREATININE-BSD FRML MDRD: 38 ML/MIN/1.73M2
GFR SERPL CREATININE-BSD FRML MDRD: 38 ML/MIN/1.73M2
GLUCOSE BLD-MCNC: 153 MG/DL (ref 75–110)
GLUCOSE BLD-MCNC: 153 MG/DL (ref 75–110)
GLUCOSE BLD-MCNC: 158 MG/DL (ref 75–110)
GLUCOSE BLD-MCNC: 158 MG/DL (ref 75–110)
GLUCOSE BLD-MCNC: 218 MG/DL (ref 75–110)
GLUCOSE BLD-MCNC: 218 MG/DL (ref 75–110)
GLUCOSE BLD-MCNC: 231 MG/DL (ref 75–110)
GLUCOSE BLD-MCNC: 231 MG/DL (ref 75–110)
GLUCOSE SERPL-MCNC: 167 MG/DL (ref 70–99)
GLUCOSE SERPL-MCNC: 167 MG/DL (ref 70–99)
GLUCOSE UR STRIP.AUTO-MCNC: NEGATIVE MG/DL
GLUCOSE UR STRIP.AUTO-MCNC: NEGATIVE MG/DL
HCT VFR BLD AUTO: 35.1 % (ref 40.7–50.3)
HCT VFR BLD AUTO: 35.1 % (ref 40.7–50.3)
HGB BLD-MCNC: 11.3 G/DL (ref 13–17)
HGB BLD-MCNC: 11.3 G/DL (ref 13–17)
IMMATURE GRANULOCYTES: 0 %
IMMATURE GRANULOCYTES: 0 %
KETONES UR STRIP.AUTO-MCNC: NEGATIVE MG/DL
KETONES UR STRIP.AUTO-MCNC: NEGATIVE MG/DL
LEUKOCYTE ESTERASE UR QL STRIP.AUTO: NEGATIVE
LEUKOCYTE ESTERASE UR QL STRIP.AUTO: NEGATIVE
LYMPHOCYTES # BLD: 15 % (ref 24–43)
LYMPHOCYTES # BLD: 15 % (ref 24–43)
LYMPHOCYTES, BODY FLUID: 32 %
LYMPHOCYTES, BODY FLUID: 32 %
MCH RBC QN AUTO: 32.8 PG (ref 25.2–33.5)
MCH RBC QN AUTO: 32.8 PG (ref 25.2–33.5)
MCHC RBC AUTO-ENTMCNC: 32.2 G/DL (ref 28.4–34.8)
MCHC RBC AUTO-ENTMCNC: 32.2 G/DL (ref 28.4–34.8)
MCV RBC AUTO: 101.7 FL (ref 82.6–102.9)
MCV RBC AUTO: 101.7 FL (ref 82.6–102.9)
MONOCYTES # BLD: 12 % (ref 3–12)
MONOCYTES # BLD: 12 % (ref 3–12)
NEUTROPHIL, FLUID: 3 %
NEUTROPHIL, FLUID: 3 %
NITRITE UR QL STRIP.AUTO: NEGATIVE
NITRITE UR QL STRIP.AUTO: NEGATIVE
NRBC AUTOMATED: 0 PER 100 WBC
NRBC AUTOMATED: 0 PER 100 WBC
OTHER CELLS FLUID: NORMAL %
OTHER CELLS FLUID: NORMAL %
PDW BLD-RTO: 13.2 % (ref 11.8–14.4)
PDW BLD-RTO: 13.2 % (ref 11.8–14.4)
PLATELET # BLD AUTO: 149 K/UL (ref 138–453)
PLATELET # BLD AUTO: 149 K/UL (ref 138–453)
PMV BLD AUTO: 9.1 FL (ref 8.1–13.5)
PMV BLD AUTO: 9.1 FL (ref 8.1–13.5)
POTASSIUM SERPL-SCNC: 3.8 MMOL/L (ref 3.7–5.3)
POTASSIUM SERPL-SCNC: 3.8 MMOL/L (ref 3.7–5.3)
PROT UR STRIP.AUTO-MCNC: 5.5 MG/DL (ref 5–8)
PROT UR STRIP.AUTO-MCNC: 5.5 MG/DL (ref 5–8)
PROT UR STRIP.AUTO-MCNC: NEGATIVE MG/DL
PROT UR STRIP.AUTO-MCNC: NEGATIVE MG/DL
RBC # BLD: 3.45 M/UL (ref 4.21–5.77)
RBC # BLD: 3.45 M/UL (ref 4.21–5.77)
RBC FLUID: <3000 CELLS/UL
RBC FLUID: <3000 CELLS/UL
SEG NEUTROPHILS: 72 % (ref 36–65)
SEG NEUTROPHILS: 72 % (ref 36–65)
SEGMENTED NEUTROPHILS ABSOLUTE COUNT: 3.4 K/UL (ref 1.5–8.1)
SEGMENTED NEUTROPHILS ABSOLUTE COUNT: 3.4 K/UL (ref 1.5–8.1)
SODIUM SERPL-SCNC: 139 MMOL/L (ref 135–144)
SODIUM SERPL-SCNC: 139 MMOL/L (ref 135–144)
SPECIFIC GRAVITY UA: 1.02 (ref 1–1.03)
SPECIFIC GRAVITY UA: 1.02 (ref 1–1.03)
SPECIMEN TYPE: NORMAL
SPECIMEN TYPE: NORMAL
SURGICAL PATHOLOGY REPORT: NORMAL
SURGICAL PATHOLOGY REPORT: NORMAL
TURBIDITY: CLEAR
TURBIDITY: CLEAR
URINE HGB: NEGATIVE
URINE HGB: NEGATIVE
UROBILINOGEN, URINE: ABNORMAL
UROBILINOGEN, URINE: ABNORMAL
WBC # BLD AUTO: 4.8 K/UL (ref 3.5–11.3)
WBC # BLD AUTO: 4.8 K/UL (ref 3.5–11.3)
WBC FLUID: 80 CELLS/UL
WBC FLUID: 80 CELLS/UL

## 2023-04-06 PROCEDURE — 6370000000 HC RX 637 (ALT 250 FOR IP)

## 2023-04-06 PROCEDURE — 81003 URINALYSIS AUTO W/O SCOPE: CPT

## 2023-04-06 PROCEDURE — 85025 COMPLETE CBC W/AUTO DIFF WBC: CPT

## 2023-04-06 PROCEDURE — 97535 SELF CARE MNGMENT TRAINING: CPT

## 2023-04-06 PROCEDURE — C9113 INJ PANTOPRAZOLE SODIUM, VIA: HCPCS

## 2023-04-06 PROCEDURE — 82947 ASSAY GLUCOSE BLOOD QUANT: CPT

## 2023-04-06 PROCEDURE — 80048 BASIC METABOLIC PNL TOTAL CA: CPT

## 2023-04-06 PROCEDURE — 97530 THERAPEUTIC ACTIVITIES: CPT

## 2023-04-06 PROCEDURE — 36415 COLL VENOUS BLD VENIPUNCTURE: CPT

## 2023-04-06 PROCEDURE — 6360000002 HC RX W HCPCS

## 2023-04-06 PROCEDURE — 97162 PT EVAL MOD COMPLEX 30 MIN: CPT

## 2023-04-06 PROCEDURE — 1200000000 HC SEMI PRIVATE

## 2023-04-06 PROCEDURE — 97166 OT EVAL MOD COMPLEX 45 MIN: CPT

## 2023-04-06 PROCEDURE — 2580000003 HC RX 258

## 2023-04-06 PROCEDURE — 2060000000 HC ICU INTERMEDIATE R&B

## 2023-04-06 PROCEDURE — 99232 SBSQ HOSP IP/OBS MODERATE 35: CPT | Performed by: INTERNAL MEDICINE

## 2023-04-06 RX ORDER — MIDODRINE HYDROCHLORIDE 5 MG/1
5 TABLET ORAL 3 TIMES DAILY PRN
Status: DISCONTINUED | OUTPATIENT
Start: 2023-04-06 | End: 2023-04-08 | Stop reason: HOSPADM

## 2023-04-06 RX ADMIN — LACTULOSE 30 G: 20 SOLUTION ORAL at 09:03

## 2023-04-06 RX ADMIN — SODIUM CHLORIDE, PRESERVATIVE FREE 40 MG: 5 INJECTION INTRAVENOUS at 09:03

## 2023-04-06 RX ADMIN — LACTULOSE 30 G: 20 SOLUTION ORAL at 17:27

## 2023-04-06 RX ADMIN — SODIUM CHLORIDE, PRESERVATIVE FREE 10 ML: 5 INJECTION INTRAVENOUS at 09:00

## 2023-04-06 RX ADMIN — HEPARIN SODIUM 5000 UNITS: 5000 INJECTION INTRAVENOUS; SUBCUTANEOUS at 17:28

## 2023-04-06 RX ADMIN — SODIUM CHLORIDE, PRESERVATIVE FREE 10 ML: 5 INJECTION INTRAVENOUS at 21:36

## 2023-04-06 RX ADMIN — RIFAXIMIN 550 MG: 550 TABLET ORAL at 21:22

## 2023-04-06 RX ADMIN — MIDODRINE HYDROCHLORIDE 2.5 MG: 5 TABLET ORAL at 09:03

## 2023-04-06 RX ADMIN — RIFAXIMIN 550 MG: 550 TABLET ORAL at 09:03

## 2023-04-06 RX ADMIN — HEPARIN SODIUM 5000 UNITS: 5000 INJECTION INTRAVENOUS; SUBCUTANEOUS at 21:22

## 2023-04-06 RX ADMIN — HEPARIN SODIUM 5000 UNITS: 5000 INJECTION INTRAVENOUS; SUBCUTANEOUS at 06:50

## 2023-04-06 RX ADMIN — INSULIN LISPRO 2 UNITS: 100 INJECTION, SOLUTION INTRAVENOUS; SUBCUTANEOUS at 18:06

## 2023-04-06 NOTE — DISCHARGE INSTR - COC
only, NOT a DME order):  walker  Other Treatments: cream to reddened coccyx     Patient's personal belongings (please select all that are sent with patient):  None    RN SIGNATURE:  Electronically signed by Gerardine Cooks, RN on 4/8/23 at 10:05 AM EDT    CASE MANAGEMENT/SOCIAL WORK SECTION    Inpatient Status Date: ***    Readmission Risk Assessment Score:  Readmission Risk              Risk of Unplanned Readmission:  16           Discharging to Facility/ Agency   Name: [facility]  Address:  Phone: [phone]  Fax:    Dialysis Facility (if applicable)   Name:  Address:  Dialysis Schedule:  Phone:  Fax:    / signature: Electronically signed by Darinel Richards RN on 4/8/23 at 9:42 AM EDT    PHYSICIAN SECTION    Prognosis: Fair    Condition at Discharge: Stable    Rehab Potential (if transferring to Rehab): Fair    Recommended Labs or Other Treatments After Discharge: paracentesis 1/week or as needed  with albumin per protocol . Glucose mangaement via sliding scale . Physician Certification: I certify the above information and transfer of Brook Peguero  is necessary for the continuing treatment of the diagnosis listed and that he requires [facility] for greater 30 days.      Update Admission H&P: No change in H&P    PHYSICIAN SIGNATURE:  Electronically signed by Ann Sears MD on 4/6/23 at 1:00 PM EDT

## 2023-04-06 NOTE — PLAN OF CARE
Daughter Tosin called. Stated patient had [facility] [facility] She plans to be here tomorrow at 0730 to help set up and facilitate visit.
Problem: Discharge Planning  Goal: Discharge to home or other facility with appropriate resources  4/6/2023 1424 by Niki Sterling RN  Outcome: Progressing  4/6/2023 0507 by Sara Danielle RN  Outcome: Progressing     Problem: Safety - Adult  Goal: Free from fall injury  4/6/2023 1424 by Niki Sterling RN  Outcome: Progressing  4/6/2023 0507 by Sara Danielle RN  Outcome: Progressing     Problem: Neurosensory - Adult  Goal: Achieves stable or improved neurological status  4/6/2023 1424 by Niki Sterling RN  Outcome: Progressing  4/6/2023 0507 by Sara Danielle RN  Outcome: Progressing  Goal: Achieves maximal functionality and self care  4/6/2023 1424 by Niki Sterling RN  Outcome: Progressing  4/6/2023 0507 by Sara Danielle RN  Outcome: Progressing     Problem: Skin/Tissue Integrity - Adult  Goal: Skin integrity remains intact  4/6/2023 1424 by Niki Sterling RN  Outcome: Progressing  4/6/2023 0507 by Sara Danielle RN  Outcome: Progressing  Goal: Incisions, wounds, or drain sites healing without S/S of infection  4/6/2023 1424 by Niki Sterling RN  Outcome: Progressing  4/6/2023 0507 by Sara Danielle RN  Outcome: Progressing     Problem: Genitourinary - Adult  Goal: Absence of urinary retention  4/6/2023 1424 by Niki Sterling RN  Outcome: Progressing  4/6/2023 0507 by Sara Danielle RN  Outcome: Progressing  Goal: Urinary catheter remains patent  4/6/2023 1424 by Niki Sterling RN  Outcome: Progressing  4/6/2023 0507 by Sara Danielle RN  Outcome: Progressing     Problem: Metabolic/Fluid and Electrolytes - Adult  Goal: Electrolytes maintained within normal limits  4/6/2023 1424 by Niki Sterling RN  Outcome: Progressing  4/6/2023 0507 by Sara Danielle RN  Outcome: Progressing  Goal: Hemodynamic stability and optimal renal function maintained  4/6/2023 1424 by Niki Sterling RN  Outcome: Progressing  4/6/2023 0507 by Sara Danielle RN  Outcome: Progressing  Goal: Glucose maintained within prescribed range  4/6/2023 1424
Problem: Discharge Planning  Goal: Discharge to home or other facility with appropriate resources  Outcome: Progressing     Problem: Safety - Adult  Goal: Free from fall injury  Outcome: Progressing     Problem: Neurosensory - Adult  Goal: Achieves stable or improved neurological status  Outcome: Progressing  Goal: Achieves maximal functionality and self care  Outcome: Progressing     Problem: Skin/Tissue Integrity - Adult  Goal: Skin integrity remains intact  Outcome: Progressing  Goal: Incisions, wounds, or drain sites healing without S/S of infection  Outcome: Progressing     Problem: Genitourinary - Adult  Goal: Absence of urinary retention  Outcome: Progressing  Goal: Urinary catheter remains patent  Outcome: Progressing     Problem: Metabolic/Fluid and Electrolytes - Adult  Goal: Electrolytes maintained within normal limits  Outcome: Progressing  Goal: Hemodynamic stability and optimal renal function maintained  Outcome: Progressing  Goal: Glucose maintained within prescribed range  Outcome: Progressing
Problem: Discharge Planning  Goal: Discharge to home or other facility with appropriate resources  Outcome: Progressing     Problem: Safety - Adult  Goal: Free from fall injury  Outcome: Progressing     Problem: Neurosensory - Adult  Goal: Achieves stable or improved neurological status  Outcome: Progressing  Goal: Achieves maximal functionality and self care  Outcome: Progressing     Problem: Skin/Tissue Integrity - Adult  Goal: Skin integrity remains intact  Outcome: Progressing  Goal: Incisions, wounds, or drain sites healing without S/S of infection  Outcome: Progressing     Problem: Genitourinary - Adult  Goal: Absence of urinary retention  Outcome: Progressing  Goal: Urinary catheter remains patent  Outcome: Progressing     Problem: Metabolic/Fluid and Electrolytes - Adult  Goal: Electrolytes maintained within normal limits  Outcome: Progressing  Goal: Hemodynamic stability and optimal renal function maintained  Outcome: Progressing  Goal: Glucose maintained within prescribed range  Outcome: Progressing
by Heidi Yoo RN  Outcome: Progressing  4/5/2023 1656 by Jonah Barajas RN  Outcome: Progressing

## 2023-04-07 LAB
ABSOLUTE EOS #: 0.2 K/UL (ref 0–0.44)
ABSOLUTE EOS #: 0.2 K/UL (ref 0–0.44)
ABSOLUTE IMMATURE GRANULOCYTE: 0.03 K/UL (ref 0–0.3)
ABSOLUTE IMMATURE GRANULOCYTE: 0.03 K/UL (ref 0–0.3)
ABSOLUTE LYMPH #: 1.04 K/UL (ref 1.1–3.7)
ABSOLUTE LYMPH #: 1.04 K/UL (ref 1.1–3.7)
ABSOLUTE MONO #: 0.62 K/UL (ref 0.1–1.2)
ABSOLUTE MONO #: 0.62 K/UL (ref 0.1–1.2)
ANION GAP SERPL CALCULATED.3IONS-SCNC: 12 MMOL/L (ref 9–17)
ANION GAP SERPL CALCULATED.3IONS-SCNC: 12 MMOL/L (ref 9–17)
BASOPHILS # BLD: 1 % (ref 0–2)
BASOPHILS # BLD: 1 % (ref 0–2)
BASOPHILS ABSOLUTE: 0.03 K/UL (ref 0–0.2)
BASOPHILS ABSOLUTE: 0.03 K/UL (ref 0–0.2)
BUN SERPL-MCNC: 50 MG/DL (ref 8–23)
BUN SERPL-MCNC: 50 MG/DL (ref 8–23)
CALCIUM SERPL-MCNC: 8.7 MG/DL (ref 8.6–10.4)
CALCIUM SERPL-MCNC: 8.7 MG/DL (ref 8.6–10.4)
CHLORIDE SERPL-SCNC: 101 MMOL/L (ref 98–107)
CHLORIDE SERPL-SCNC: 101 MMOL/L (ref 98–107)
CO2 SERPL-SCNC: 24 MMOL/L (ref 20–31)
CO2 SERPL-SCNC: 24 MMOL/L (ref 20–31)
CREAT SERPL-MCNC: 1.96 MG/DL (ref 0.7–1.2)
CREAT SERPL-MCNC: 1.96 MG/DL (ref 0.7–1.2)
EOSINOPHILS RELATIVE PERCENT: 4 % (ref 1–4)
EOSINOPHILS RELATIVE PERCENT: 4 % (ref 1–4)
GFR SERPL CREATININE-BSD FRML MDRD: 37 ML/MIN/1.73M2
GFR SERPL CREATININE-BSD FRML MDRD: 37 ML/MIN/1.73M2
GLUCOSE BLD-MCNC: 140 MG/DL (ref 75–110)
GLUCOSE BLD-MCNC: 140 MG/DL (ref 75–110)
GLUCOSE BLD-MCNC: 225 MG/DL (ref 75–110)
GLUCOSE BLD-MCNC: 225 MG/DL (ref 75–110)
GLUCOSE BLD-MCNC: 228 MG/DL (ref 75–110)
GLUCOSE BLD-MCNC: 228 MG/DL (ref 75–110)
GLUCOSE BLD-MCNC: 263 MG/DL (ref 75–110)
GLUCOSE BLD-MCNC: 263 MG/DL (ref 75–110)
GLUCOSE SERPL-MCNC: 181 MG/DL (ref 70–99)
GLUCOSE SERPL-MCNC: 181 MG/DL (ref 70–99)
HCT VFR BLD AUTO: 34.2 % (ref 40.7–50.3)
HCT VFR BLD AUTO: 34.2 % (ref 40.7–50.3)
HGB BLD-MCNC: 11.6 G/DL (ref 13–17)
HGB BLD-MCNC: 11.6 G/DL (ref 13–17)
IMMATURE GRANULOCYTES: 1 %
IMMATURE GRANULOCYTES: 1 %
LYMPHOCYTES # BLD: 18 % (ref 24–43)
LYMPHOCYTES # BLD: 18 % (ref 24–43)
MCH RBC QN AUTO: 33.2 PG (ref 25.2–33.5)
MCH RBC QN AUTO: 33.2 PG (ref 25.2–33.5)
MCHC RBC AUTO-ENTMCNC: 33.9 G/DL (ref 28.4–34.8)
MCHC RBC AUTO-ENTMCNC: 33.9 G/DL (ref 28.4–34.8)
MCV RBC AUTO: 98 FL (ref 82.6–102.9)
MCV RBC AUTO: 98 FL (ref 82.6–102.9)
MONOCYTES # BLD: 11 % (ref 3–12)
MONOCYTES # BLD: 11 % (ref 3–12)
NRBC AUTOMATED: 0 PER 100 WBC
NRBC AUTOMATED: 0 PER 100 WBC
PDW BLD-RTO: 13.3 % (ref 11.8–14.4)
PDW BLD-RTO: 13.3 % (ref 11.8–14.4)
PLATELET # BLD AUTO: 174 K/UL (ref 138–453)
PLATELET # BLD AUTO: 174 K/UL (ref 138–453)
PMV BLD AUTO: 9 FL (ref 8.1–13.5)
PMV BLD AUTO: 9 FL (ref 8.1–13.5)
POTASSIUM SERPL-SCNC: 3.6 MMOL/L (ref 3.7–5.3)
POTASSIUM SERPL-SCNC: 3.6 MMOL/L (ref 3.7–5.3)
RBC # BLD: 3.49 M/UL (ref 4.21–5.77)
RBC # BLD: 3.49 M/UL (ref 4.21–5.77)
SEG NEUTROPHILS: 65 % (ref 36–65)
SEG NEUTROPHILS: 65 % (ref 36–65)
SEGMENTED NEUTROPHILS ABSOLUTE COUNT: 3.82 K/UL (ref 1.5–8.1)
SEGMENTED NEUTROPHILS ABSOLUTE COUNT: 3.82 K/UL (ref 1.5–8.1)
SODIUM SERPL-SCNC: 137 MMOL/L (ref 135–144)
SODIUM SERPL-SCNC: 137 MMOL/L (ref 135–144)
WBC # BLD AUTO: 5.7 K/UL (ref 3.5–11.3)
WBC # BLD AUTO: 5.7 K/UL (ref 3.5–11.3)

## 2023-04-07 PROCEDURE — 6370000000 HC RX 637 (ALT 250 FOR IP)

## 2023-04-07 PROCEDURE — 82947 ASSAY GLUCOSE BLOOD QUANT: CPT

## 2023-04-07 PROCEDURE — 2060000000 HC ICU INTERMEDIATE R&B

## 2023-04-07 PROCEDURE — 85025 COMPLETE CBC W/AUTO DIFF WBC: CPT

## 2023-04-07 PROCEDURE — 6360000002 HC RX W HCPCS

## 2023-04-07 PROCEDURE — C9113 INJ PANTOPRAZOLE SODIUM, VIA: HCPCS

## 2023-04-07 PROCEDURE — 36415 COLL VENOUS BLD VENIPUNCTURE: CPT

## 2023-04-07 PROCEDURE — 80048 BASIC METABOLIC PNL TOTAL CA: CPT

## 2023-04-07 PROCEDURE — 99232 SBSQ HOSP IP/OBS MODERATE 35: CPT | Performed by: INTERNAL MEDICINE

## 2023-04-07 PROCEDURE — 2580000003 HC RX 258

## 2023-04-07 RX ADMIN — INSULIN LISPRO 4 UNITS: 100 INJECTION, SOLUTION INTRAVENOUS; SUBCUTANEOUS at 17:10

## 2023-04-07 RX ADMIN — LACTULOSE 30 G: 20 SOLUTION ORAL at 21:20

## 2023-04-07 RX ADMIN — SODIUM CHLORIDE, PRESERVATIVE FREE 40 MG: 5 INJECTION INTRAVENOUS at 08:50

## 2023-04-07 RX ADMIN — LACTULOSE 30 G: 20 SOLUTION ORAL at 08:50

## 2023-04-07 RX ADMIN — RIFAXIMIN 550 MG: 550 TABLET ORAL at 08:50

## 2023-04-07 RX ADMIN — RIFAXIMIN 550 MG: 550 TABLET ORAL at 21:20

## 2023-04-07 RX ADMIN — SODIUM CHLORIDE, PRESERVATIVE FREE 10 ML: 5 INJECTION INTRAVENOUS at 08:51

## 2023-04-07 RX ADMIN — HEPARIN SODIUM 5000 UNITS: 5000 INJECTION INTRAVENOUS; SUBCUTANEOUS at 14:23

## 2023-04-07 RX ADMIN — HEPARIN SODIUM 5000 UNITS: 5000 INJECTION INTRAVENOUS; SUBCUTANEOUS at 21:20

## 2023-04-07 RX ADMIN — HEPARIN SODIUM 5000 UNITS: 5000 INJECTION INTRAVENOUS; SUBCUTANEOUS at 05:23

## 2023-04-07 RX ADMIN — LACTULOSE 30 G: 20 SOLUTION ORAL at 14:23

## 2023-04-08 VITALS
BODY MASS INDEX: 23.27 KG/M2 | SYSTOLIC BLOOD PRESSURE: 94 MMHG | WEIGHT: 171.8 LBS | SYSTOLIC BLOOD PRESSURE: 94 MMHG | RESPIRATION RATE: 9 BRPM | TEMPERATURE: 98.5 F | BODY MASS INDEX: 23.27 KG/M2 | OXYGEN SATURATION: 100 % | TEMPERATURE: 98.5 F | HEART RATE: 75 BPM | HEART RATE: 75 BPM | DIASTOLIC BLOOD PRESSURE: 66 MMHG | OXYGEN SATURATION: 100 % | DIASTOLIC BLOOD PRESSURE: 66 MMHG | HEIGHT: 72 IN | HEIGHT: 72 IN | RESPIRATION RATE: 9 BRPM | WEIGHT: 171.8 LBS

## 2023-04-08 LAB
GLUCOSE BLD-MCNC: 187 MG/DL (ref 75–110)
GLUCOSE BLD-MCNC: 187 MG/DL (ref 75–110)
GLUCOSE BLD-MCNC: 239 MG/DL (ref 75–110)
GLUCOSE BLD-MCNC: 239 MG/DL (ref 75–110)
GLUCOSE BLD-MCNC: 245 MG/DL (ref 75–110)
GLUCOSE BLD-MCNC: 245 MG/DL (ref 75–110)

## 2023-04-08 PROCEDURE — 94761 N-INVAS EAR/PLS OXIMETRY MLT: CPT

## 2023-04-08 PROCEDURE — 82947 ASSAY GLUCOSE BLOOD QUANT: CPT

## 2023-04-08 PROCEDURE — 99232 SBSQ HOSP IP/OBS MODERATE 35: CPT | Performed by: INTERNAL MEDICINE

## 2023-04-08 PROCEDURE — 2580000003 HC RX 258

## 2023-04-08 PROCEDURE — 6370000000 HC RX 637 (ALT 250 FOR IP)

## 2023-04-08 PROCEDURE — C9113 INJ PANTOPRAZOLE SODIUM, VIA: HCPCS

## 2023-04-08 PROCEDURE — 6360000002 HC RX W HCPCS

## 2023-04-08 RX ORDER — MIDODRINE HYDROCHLORIDE 5 MG/1
5 TABLET ORAL 3 TIMES DAILY PRN
Qty: 90 TABLET | Refills: 3 | Status: SHIPPED | OUTPATIENT
Start: 2023-04-08

## 2023-04-08 RX ORDER — LACTULOSE 10 G/15ML
30 SOLUTION ORAL 3 TIMES DAILY
Qty: 1 EACH | Refills: 0 | Status: SHIPPED | OUTPATIENT
Start: 2023-04-08 | End: 2023-05-08

## 2023-04-08 RX ORDER — PROPRANOLOL HCL 60 MG
60 CAPSULE, EXTENDED RELEASE 24HR ORAL DAILY
Qty: 30 CAPSULE | Refills: 0 | Status: SHIPPED | OUTPATIENT
Start: 2023-04-08 | End: 2023-04-08 | Stop reason: HOSPADM

## 2023-04-08 RX ADMIN — RIFAXIMIN 550 MG: 550 TABLET ORAL at 09:22

## 2023-04-08 RX ADMIN — LACTULOSE 30 G: 20 SOLUTION ORAL at 09:22

## 2023-04-08 RX ADMIN — SODIUM CHLORIDE, PRESERVATIVE FREE 10 ML: 5 INJECTION INTRAVENOUS at 09:24

## 2023-04-08 RX ADMIN — SODIUM CHLORIDE, PRESERVATIVE FREE 40 MG: 5 INJECTION INTRAVENOUS at 09:22

## 2023-04-08 RX ADMIN — HEPARIN SODIUM 5000 UNITS: 5000 INJECTION INTRAVENOUS; SUBCUTANEOUS at 05:54

## 2023-04-08 NOTE — CARE COORDINATION
CM received call from patient's Drucie Room stating that she will be up to the hospital today after 10am and would like to discuss transitional planning at that time.
CM spoke with patient at bedside to discuss transitional planning. SNF vs HHC options discussed and patient states that he would be interested in SNF placement at discharge. CM provided patient with hospital SNF list for review. Patient states that he will discuss SNF choices with his family and provide CM with SNF choices later today. CM called and left message requesting call back for patient's S/O Josi Bernard [phone]. Requested call back to discuss transitional plan. Post Acute Facility/Agency List     Provided patient with the following list, the list includes the overall star ratings obtained from CMS per the Medicare Web site (www.Medicare.gov):     [] [address]  [] Acute Inpatient Rehabilitation Facilities  [x] Skilled Nursing Facilities  [] Home Care    Provided verbal instructions on how to utilize the QR Code to obtain additional detailed star ratings from www. Medicare. gov     offered to print and provide the detailed list:    []Accepted   [x]Declined    26- Patient's niece requests that referrals be sent to [facility] at [address] [place] [address] of [facility]. Referrals sent to [facility].    [phone] Call received from [facility] with [facility] requesting patient's SS# to run benefits. Patient provided CM with SS# . [facility] states that she will update CM later this afternoon if they are able to accept. 1500- Call received from [facility] [place] [address] with Ethyl Scriver both stating they are able to accept. Patient has chosen to discharge to [facility]. CM faxed transportation request to [address] [place] [address] requesting 12p transportation tomorrow. Discharge order, KIM and HENS needed prior to discharge.
Discharge [address] Case Management Department  Written by: Martin Montoya RN    Patient Name: Kyrie Perez  Attending Provider: Lexus Nair MD  Admit Date: 2023 11:29 AM  MRN: [id number]  Account: [de-identified]                     : 1955  Discharge Date: 2023      Dis[facility]    Martin Montoya RN
SBIRT- deferred  Case discussed in IDR  Per RN, pt is pleasantly confused. Unable to complete assessment at this time. Alcohol Screening and Brief  intervention  I have not  interviewed Holger Machado, [phone] regarding  His alcohol consumption/drug use .     Deferred [x]    Completed on: 4/6/2023   [facility]
Transitional planning  Spoke to Kristyn with [facility], informed her of noon transport. Sierra Dodson [phone]  Report [phone]    Spoke to [facility] with [facility] [address], she had pt scheduled for 5pm transport. She  will call around and call CM back. Francisca with [facility] [address] called pt will be transported at 1:30pm with [facility] ambulance. Informed pt's RN, Aminah Dc and Kristyn with [facility] and pt's GF in pt's room- She will inform pt of transport time. Provided pt's RNAminah with report number    HENS completed.  AVS/KIM faxed to [phone]
Present Housing: Yes  Other Identified Issues/Barriers to RETURNING to current housing: awaiting for girlfriend POA to verify transition plan  Potential Assistance needed at discharge: [address], Durable Medical Equipment            Potential DME: William Bradley  Patient expects to discharge to:    Plan for transportation at discharge:      Financial    Payor: [address] [place] [address] / Plan: MEDICARE PART A AND B / Product Type: *No Product type* /     Does insurance require precert for SNF: No    Potential assistance Purchasing Medications:    Meds-to-Beds request: Yes    No Pharmacies Listed    Notes:    Factors facilitating achievement of predicted outcomes: Family support    Barriers to discharge: Confusion    Additional Case Management Notes: patient is from home with his [address]. Stella Christina has asked me to call to discuss transition plan. I have called several times awaiting a return call. The Plan for Transition of Care is related to the following treatment goals of Hepatic encephalopathy ([facility] [K76.82]  Hyperammonemia ([facility] [address] [facility] [E72.20]  Altered mental status [R41.82]  Fall, initial encounter [W19. XXXA]  Altered mental status, unspecified altered mental status type [Q81.08]    IF APPLICABLE: The Patient and/or patient representative Paz Lazo and his family were provided with a choice of provider and agrees with the discharge plan. Freedom of choice list with basic dialogue that supports the patient's individualized plan of care/goals and shares the quality data associated with the providers was provided to:     Patient Representative Name:       The Patient and/or Patient Representative Agree with the Discharge Plan?       Darrel Amos RN  Case Management Department  Ph: [phone] Fax:

## 2023-04-08 NOTE — DISCHARGE INSTRUCTIONS
Continue to take rifaximin 550 two times a day . Continue to take lactulose and titrate it to 2-3 bowel movements per day .  Avoid costipation

## 2023-04-09 LAB
MICROORGANISM SPEC CULT: ABNORMAL
MICROORGANISM/AGENT SPEC: ABNORMAL
SPECIMEN DESCRIPTION: ABNORMAL

## 2023-04-11 NOTE — DISCHARGE SUMMARY
[address]     Department of Internal Medicine - Staff Internal Medicine Teaching Service    INPATIENT DISCHARGE SUMMARY      Patient Identification:  Jerry Flores is a 75 y.o. male. :  1955  MRN: [id number]     Acct: [de-identified]   PCP: Rebecca Saavedra MD  Admit Date:  2023  Discharge date and time: 2023  2:12 PM  Attending Provider: Dr. Jim Russell Problem Lists:  Principal Problem:    Hepatic encephalopathy [facility])  Active Problems:    Fall    Altered mental status    Liver cirrhosis secondary to BAUTISTA ([facility] [address]    Abdominal ascites    Awaiting liver transplant    Stage 3b chronic kidney disease (HCC)    Type 2 diabetes mellitus, with long-term current use of insulin (HCC)    Hypersplenism    Portal hypertensive gastropathy (HCC)    Esophageal varices without bleeding ([facility] [address]    Hypothyroidism due to Hashimoto's thyroiditis    HLD (hyperlipidemia)    BPH (benign prostatic hyperplasia)    LVH (left ventricular hypertrophy)    Hyperammonemia (HCC)    Elevated troponin    Constipation  Resolved Problems:    * No resolved hospital problems. *      HOSPITAL STAY     Brief Inpatient course:   Jerry Flores is a 75 y.o. male with PMHx of Liver cirrhosis 2/2 BAUTISTA w/ h/o hepatic encephalopathy and esophageal varices, moderate portal HTN gastropathy, CKD3b 2/2 hepatorenal syndrome type 2 and diabetic nephropathy, T2DM, hypothyroidism 2/2 hashimoto's thyroiditis, HTN, and HLD who presented to the ED with complaints of altered mental status. He was found to be in decompensated liver cirrhosis w/ hepatic encephalopathy  and large volume ascites likely 2/2 constipation. He was restarted on his lactulose and rifaximin. Patient had a fall day prior to admission, trauma work up was negative. S/p IR guided paracentesis 23, albumin given for large volume tap, SBP ruled out.  The patients mentation improved, He had a

## 2023-04-14 NOTE — PROGRESS NOTES
Patient transferred to the floor report called
Physician Progress Note      Jamie Aranda  CSN #:                  [id number]  :                       1955  ADMIT DATE:       2023 11:29 AM  DISCH DATE:        2023 2:12 PM  RESPONDING  PROVIDER #:        Sergio Montanez          QUERY TEXT:    Pt admitted with AMS-Hepatic encephalopathy. If possible, please document  if   you are evaluated and / or treated any of the following: The medical record reflects the following:  Risk Factors: Liver cirrhosis on liver transplant list, CKD  Clinical Indicators: Per H&p ' Acute metabolic encephalopathy w/   hyperammonemia, likely d/t hepatic encephalopathy, in the setting of liver   cirrhosis 2/2 BAUTISTA w/ portal hypertensive gastropathy, esophageal varices w/o   bleeding, and large volume ascites, in acute decompensation.' Per DC summary '   decompensated liver cirrhosis w/ hepatic encephalopathy  and large volume   ascites likely 2/2 constipation. '. . Ammonia 94, Myoglobin 83, Trops 47,   albumin 3.1, Creat 2.21  Treatment: Lactulose,Rifaximin, Paracentesis    Thank you,  Kenzie CHATTERJEES  [email or web address]  office hours  m-f 7-3  Options provided:  -- Toxic metabolic encephalopathy due to Hepatic Encephalopathy  -- Other - I will add my own diagnosis  -- Disagree - Not applicable / Not valid  -- Disagree - Clinically unable to determine / Unknown  -- Refer to Clinical Documentation Reviewer    PROVIDER RESPONSE TEXT:    This patient had toxic metabolic encephalopathy d/t hepatic encephalopathy.     Query created by: America Perez on 2023 8:56 AM      Electronically signed by:  Sergio Montanez 2023 9:19 AM
Pt arrived to unit, continuously trying to get out of bed, disoriented x4 daughter at bedside stating this is the worst he has been, sutures noted to forehead and skin tear to left knee
Pt arrives to room for paracentesis with albumin infusing  JR AYERS and KV RDMS to bedside  Site prepped and draped  Access obtained and draining   Specimen obtained  7.3L removed  Access removed and site covered with dsd  Return to floor  Report to [employer]
Trauma Tertiary Survey    Admit Date: 4/4/2023  Hospital day 0      Subjective:     Patient is a 26-year-old male who fell while walking outside the house on/3/2023. Patient went to outside hospital via CT head and neck and summary of injuries was done and no injuries were noted. Patient today came to ED with altered mental status with complaint of not recalling his name and inability to get up after lying down. Patient currently is awake and alert. Does not complain of any pain. Patient is left frontal laceration was repaired with Prolene sutures at the outside hospital on 4/3/2023  Patient is on the list and getting worked up for liver transplant and possible kidney transplant. Objective:   PHYSICAL EXAM:   Physical Exam  Constitutional:       Appearance: Normal appearance. HENT:      Head: Normocephalic. Right Ear: Tympanic membrane normal.      Left Ear: Tympanic membrane normal.      Nose: Nose normal.      Mouth/Throat:      Mouth: Mucous membranes are dry. Eyes:      Extraocular Movements: Extraocular movements intact. Pupils: Pupils are equal, round, and reactive to light. Cardiovascular:      Rate and Rhythm: Normal rate and regular rhythm. Pulses: Normal pulses. Pulmonary:      Effort: Pulmonary effort is normal.      Breath sounds: Normal breath sounds. Abdominal:      General: Abdomen is flat. Bowel sounds are normal. There is no distension. Palpations: Abdomen is soft. Tenderness: There is no abdominal tenderness. There is no guarding or rebound. Comments: Healing stage bruise present on the anterior abdominal wall. Genitourinary:     Penis: Normal.    Musculoskeletal:         General: Normal range of motion. Cervical back: Normal range of motion. Skin:     General: Skin is warm. Capillary Refill: Capillary refill takes less than 2 seconds. Neurological:      General: No focal deficit present.       Mental Status: He is alert and oriented to
4/4/2023 at 1:45 PM.  [facility]  [phone]        04/04/23 1343   Encounter Summary   Service Provided For: Significant other;Family   Referral/Consult From: Multi-disciplinary team   Support System Significant other;Children   Last Encounter  04/04/23   Complexity of Encounter Moderate   Begin Time 1130   End Time  1230   Total Time Calculated 60 min   Crisis   Type Trauma   Assessment/Intervention/Outcome   Assessment Coping;Tearful   Intervention Active listening;Explored/Affirmed feelings, thoughts, concerns; Discussed illness injury and its impact;Prayer (assurance of)/[place]   Outcome Engaged in conversation;Expressed feelings, needs, and concerns;Expressed Gratitude;Receptive
developed, Well Nourished, 75 y.o. male who is AxOx2, to city and name , cooperative and in no apparent distress. Head: Laceration on Left forehead, bandaged. EENT:  PERRLA. No conjunctival injections. Septum was midline, mucosa was without erythema, exudates or cobblestoning. No thrush was noted. Neck: Supple without thyromegaly. No elevated JVP. Trachea was midline. Respiratory: Chest was symmetrical without dullness to percussion. Breath sounds bilaterally were clear to auscultation. There were no wheezes, rhonchi or rales. There is no intercostal retraction or use of accessory muscles. Cardiovascular: Regular without murmur, clicks, gallops or rubs. Abdomen: Distended, non-tender, ecchymoses present on abdomen from prior paracenteses. Musculoskeletal: Normal curvature of the spine. No gross muscle weakness. Extremities:  No lower extremity edema, ulcerations, tenderness, varicosities or erythema. Muscle size, tone and strength are normal.  No involuntary movements are noted. Skin:  Warm and dry. Good color, turgor and pigmentation. No lesions or scars.   No cyanosis or clubbing  Neurological/Psychiatric: The patient's general behavior, level of consciousness, thought content and emotional status is normal.        Medications:  Scheduled Medications:    lactulose  30 g Oral TID    sodium chloride flush  5-40 mL IntraVENous 2 times per day    pantoprazole (PROTONIX) 40 mg injection  40 mg IntraVENous Daily    heparin (porcine)  5,000 Units SubCUTAneous 3 times per day    rifAXIMin  550 mg Oral BID    insulin lispro  0-8 Units SubCUTAneous TID WC    insulin lispro  0-4 Units SubCUTAneous Nightly     Continuous Infusions:    sodium chloride      dextrose       PRN Medicationssodium chloride flush, 5-40 mL, PRN  sodium chloride, , PRN  ondansetron, 4 mg, Q8H PRN   Or  ondansetron, 4 mg, Q6H PRN  polyethylene glycol, 17 g, Daily PRN  acetaminophen, 650 mg, Q6H PRN   Or  acetaminophen, 650 mg,
and safety)  Interventions: Verbal cues; Safety awareness training;Manual cues  Assistive Device: Gait belt;Walker, rolling     AROM: Generally decreased, functional (~90 degrees B shoulder flexion; WFL otherwise)  PROM: Generally decreased, functional (~90 degrees shoulder flexion, hard end feel)  Strength: Generally decreased, functional (3+/5 grossly BUE)  Coordination: Generally decreased, functional (Pt is R handed. L index finger cut off above PIP)  Tone: Normal  Sensation: Intact    ADL  Feeding: Independent  Grooming: Stand by assistance  UE Bathing: Minimal assistance  LE Bathing: Minimal assistance  UE Dressing: Minimal assistance  LE Dressing: Minimal assistance  LE Dressing Skilled Clinical Factors: able to don/doff socks EOB in figure four position. Assist expected to thred and pull up pants  Toileting: Minimal assistance  Toileting Skilled Clinical Factors: assist to place urinal at bedside. Initally in standing position and concluded in sitting d/t decreased endurance  Additional Comments: Pt with decreased shoulder ROM, balance, functional reach and endurance limiting IND in ADL tasks. pt able to complete figure four position this date to manage socks     Activity Tolerance  Activity Tolerance: Patient limited by fatigue;Patient limited by endurance        Vision  Vision: Impaired  Vision Exceptions: Wears glasses at all times  Hearing  Hearing: Exceptions to [facility]  Hearing Exceptions: Hard of hearing/hearing concerns    Cognition  Overall Cognitive Status: Exceptions  Following Commands: Follows one step commands consistently; Follows multistep commands with increased time  Safety Judgement: Decreased awareness of need for assistance;Decreased awareness of need for safety  Insights: Decreased awareness of deficits  Initiation: Requires cues for some  Sequencing: Requires cues for some  Orientation  Overall Orientation Status: Impaired  Orientation Level: Oriented to place;Oriented to time;Oriented to
6. Enlarged prostate. 7. Coronary artery disease. Atherosclerotic calcification of the aorta and branch vasculature. 8. Right shoulder arthroplasty hardware. 9. Mild dependent atelectasis, parenchymal banding and respiratory motion. CT LUMBAR SPINE TRAUMA RECONSTRUCTION    Result Date: 4/4/2023  Thoracic spine: 1. Mild-to-moderate multilevel degenerative changes in the thoracic spine. 2. No acute vertebral body height loss or malalignment within the thoracic spine. Mild dextroscoliosis of the thoracic spine on coronal reconstructions. Lumbar spine: 1. Mild multilevel degenerative changes in the lumbar spine. 2. No acute vertebral body height loss or malalignment within the lumbar spine. Mild levoscoliosis of the lumbar spine on coronal reconstructions. Please see CT chest, abdomen, and pelvis report from 04/04/2023 for details regarding intrathoracic, intra-abdominal, and intrapelvic findings. CT THORACIC SPINE TRAUMA RECONSTRUCTION    Result Date: 4/4/2023  Thoracic spine: 1. Mild-to-moderate multilevel degenerative changes in the thoracic spine. 2. No acute vertebral body height loss or malalignment within the thoracic spine. Mild dextroscoliosis of the thoracic spine on coronal reconstructions. Lumbar spine: 1. Mild multilevel degenerative changes in the lumbar spine. 2. No acute vertebral body height loss or malalignment within the lumbar spine. Mild levoscoliosis of the lumbar spine on coronal reconstructions. Please see CT chest, abdomen, and pelvis report from 04/04/2023 for details regarding intrathoracic, intra-abdominal, and intrapelvic findings.        ASSESSMENT & PLAN     ASSESSMENT / PLAN:   Principal Problem:    Hepatic encephalopathy (HCC)  Active Problems:    Fall    Altered mental status    Liver cirrhosis secondary to BAUTISTA ([facility] [address]    Abdominal ascites    Awaiting liver transplant    Stage 3b chronic kidney disease (HCC)    Type 2 diabetes mellitus, with long-term current use of insulin
Fair;-  Comments: Standing balance assessed in RW, standing for 2 min CGA progressing to Rhea               AM-PAC Score  AM-PAC Inpatient Mobility Raw Score : 18 (04/06/23 1033)  AM-PAC Inpatient T-Scale Score : 43.63 (04/06/23 1033)  Mobility Inpatient CMS 0-100% Score: 46.58 (04/06/23 1033)  Mobility Inpatient CMS G-Code Modifier : CK (04/06/23 1033)            Goals  Short Term Goals  Time Frame for Short Term Goals: 14  Short Term Goal 1: Pt to ambulate 100ft with RW, independently  Short Term Goal 2: Pt to ascend and descend 1 stair with hand rail and least restricitve AD, independently  Short Term Goal 3: Pt to perform independent bed moblity and functional transfers  Short Term Goal 4: Pt to demonstrate improved standing balance to good minus  Short Term Goal 5: Pt to participate in 45 min therapy session to demonstrate improved endruance       Education  Patient Education  Education Given To: Family; Patient  Education Provided: Role of Therapy;Plan of Care  Education Method: Verbal  Barriers to Learning: None  Education Outcome: Verbalized understanding      Therapy Time   Individual Concurrent Group Co-treatment   Time In 0916         Time Out 0951         Minutes 35         Timed Code Treatment Minutes: Burak Quinones       Evaluation/treatment performed by Student PT under the supervision of co-signing PT who agrees with all evaluation/treatment and documentation.
Principal Problem:    Hepatic encephalopathy ([facility] [address]  Active Problems:    Fall    Altered mental status    Liver cirrhosis secondary to BAUTISTA ([facility] [address]    Abdominal ascites    Awaiting liver transplant    Stage 3b chronic kidney disease (HCC)    Type 2 diabetes mellitus, with long-term current use of insulin (HCC)    Hypersplenism    Portal hypertensive gastropathy (HCC)    Esophageal varices without bleeding (HCC)    Hypothyroidism due to Hashimoto's thyroiditis    HLD (hyperlipidemia)    BPH (benign prostatic hyperplasia)    LVH (left ventricular hypertrophy)    Hyperammonemia (HCC)    Elevated troponin    Constipation  Resolved Problems:    * No resolved hospital problems.  *     #Hepatic encephalopathy, in the setting of liver cirrhosis 2/2 BAUTISTA w/ portal hypertensive gastropathy, esophageal varices w/o bleeding, and large volume ascites, in acute decompensation  - Improved, A&Ox3  - Patient is on the liver transplant list   - S/p telehealth visit with [address] [place] [address] transplant team 4/6/23  - Continue Lactulose 30mg tid, titrate to 2-3 loose BM/day, and Rifaximin 550mg bid   - Continue protonix 40mg qd  - Continue to hold diuretics d/t HypoTN  - S/p IR guided paracentesis w/ removal of 7.3L, SBP ruled out, albumin given d/t large volume removal  - Home meds: Bumex 1mg qam and 0.5mg qhs, spironolactone 50mg qd, lactulose 10mg tid, and rifaximin 550mg bid     #CKD3b 2/2 HRS type 2 and diabetic nephropathy w/ slight creatinine elevation, baseline Cr 1.98  - Pt on kidney transplant list  - Cr stable, at baseline  - Continue to monitor  - Avoid nephrotoxic drugs  - Monitor Is and Os, daily weights     #Constipation  - improved  - Continue lactulose as above     #Subclinical hhypothyroidism in the setting of hypothyroidism 2/2 hashimoto's thyroiditis  - Not on Rx at home  - Patient will need repeat TSH and T4 in ~1 month to re-evaluate thyroid levels     #Chronic Comorbid Conditions  #T2DM on insulin  - home meds: lantus
glucose AC&HS  #HTN  - Patient has been having lower BP and HypoTN  - Continue to hold off antihypertensives  - Midodrine 2.5mg tid/prn ordered, pt has been on this at home  #HLD  - Home med: pravastatin 40mg qd     Diet: Regular, low sodium, fluid restriction 1.8L  Telemetry: Not indicated  DVT ppx: heparin 5000u subQ tid  GI ppx:  on protonix for portal HTN gastropathy     Most Recent PT: [facility] 18/24, Amb 5' w/ rolling walker  Most Recent OT: [facility] 19/24, no equipment recommended  Discharge Planning/SW: CM on board, patient accepted and agreeable with discharge to [facility] SNF, transport planned for 12p today      Carole Noel MD  Internal Medicine Resident, PGY-1  [address] [place], [place]  4/8/2023, 7:15 AM     Attending Physician Statement  I have discussed the care of Silvano Montenegro,  including pertinent history and exam findings,  with the resident. I have seen and examined the patient and the key elements of all parts of the encounter have been performed by me. I agree with the assessment, plan and orders as documented by the resident.   ([address] )    MD GILDA Corona  Attending Physician, [facility]   Faculty, Internal Medicine Residency Program  [address] [place] [address]  4/8/2023, 10:08 AM

## 2023-05-04 PROBLEM — R77.8 ELEVATED TROPONIN: Status: RESOLVED | Noted: 2023-04-04 | Resolved: 2023-05-04
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
## 2023-06-19 ENCOUNTER — APPOINTMENT (OUTPATIENT)
Dept: GENERAL RADIOLOGY | Age: 68
End: 2023-06-19
Payer: MEDICARE

## 2023-06-19 ENCOUNTER — APPOINTMENT (OUTPATIENT)
Dept: CT IMAGING | Age: 68
End: 2023-06-19
Payer: MEDICARE

## 2023-06-19 ENCOUNTER — HOSPITAL ENCOUNTER (INPATIENT)
Age: 68
LOS: 5 days | Discharge: NURSING, REHAB OR LONG-TERM CARE | End: 2023-06-24
Attending: EMERGENCY MEDICINE | Admitting: INTERNAL MEDICINE
Payer: MEDICARE

## 2023-06-19 DIAGNOSIS — R53.1 GENERAL WEAKNESS: Primary | ICD-10-CM

## 2023-06-19 PROBLEM — G93.40 ENCEPHALOPATHY ACUTE: Status: ACTIVE | Noted: 2023-06-19

## 2023-06-19 LAB
ALBUMIN SERPL-MCNC: 3 G/DL (ref 3.5–5.2)
ALBUMIN/GLOB SERPL: 0.7 {RATIO} (ref 1–2.5)
ALP SERPL-CCNC: 296 U/L (ref 40–129)
ALT SERPL-CCNC: 34 U/L (ref 5–41)
AMMONIA PLAS-SCNC: 55 UMOL/L (ref 16–60)
ANION GAP SERPL CALCULATED.3IONS-SCNC: 12 MMOL/L (ref 9–17)
AST SERPL-CCNC: 40 U/L
BASOPHILS # BLD: 0 K/UL (ref 0–0.2)
BASOPHILS NFR BLD: 0 % (ref 0–2)
BILIRUB SERPL-MCNC: 2.6 MG/DL (ref 0.3–1.2)
BILIRUB UR QL STRIP: ABNORMAL
BUN SERPL-MCNC: 40 MG/DL (ref 8–23)
CALCIUM SERPL-MCNC: 9.2 MG/DL (ref 8.6–10.4)
CASTS #/AREA URNS LPF: ABNORMAL /LPF (ref 0–2)
CASTS #/AREA URNS LPF: ABNORMAL /LPF (ref 0–2)
CHLORIDE SERPL-SCNC: 98 MMOL/L (ref 98–107)
CLARITY UR: CLEAR
CO2 SERPL-SCNC: 23 MMOL/L (ref 20–31)
COLOR UR: ABNORMAL
CREAT SERPL-MCNC: 1.91 MG/DL (ref 0.7–1.2)
EOSINOPHIL # BLD: 0 K/UL (ref 0–0.4)
EOSINOPHILS RELATIVE PERCENT: 0 % (ref 1–4)
EPI CELLS #/AREA URNS HPF: ABNORMAL /HPF (ref 0–5)
ERYTHROCYTE [DISTWIDTH] IN BLOOD BY AUTOMATED COUNT: 13.8 % (ref 11.8–14.4)
GFR SERPL CREATININE-BSD FRML MDRD: 38 ML/MIN/1.73M2
GLUCOSE BLD-MCNC: 166 MG/DL (ref 75–110)
GLUCOSE BLD-MCNC: 192 MG/DL (ref 75–110)
GLUCOSE SERPL-MCNC: 187 MG/DL (ref 70–99)
GLUCOSE UR STRIP-MCNC: NEGATIVE MG/DL
HCT VFR BLD AUTO: 33.2 % (ref 40.7–50.3)
HGB BLD-MCNC: 11.1 G/DL (ref 13–17)
HGB UR QL STRIP.AUTO: NEGATIVE
IMM GRANULOCYTES # BLD AUTO: 0 K/UL (ref 0–0.3)
IMM GRANULOCYTES NFR BLD: 0 %
INR PPP: 1.2
KETONES UR STRIP-MCNC: ABNORMAL MG/DL
LACTIC ACID, WHOLE BLOOD: 2 MMOL/L (ref 0.7–2.1)
LEUKOCYTE ESTERASE UR QL STRIP: NEGATIVE
LYMPHOCYTES # BLD: 9 % (ref 24–44)
LYMPHOCYTES NFR BLD: 0.52 K/UL (ref 1–4.8)
MCH RBC QN AUTO: 33.3 PG (ref 25.2–33.5)
MCHC RBC AUTO-ENTMCNC: 33.4 G/DL (ref 28.4–34.8)
MCV RBC AUTO: 99.7 FL (ref 82.6–102.9)
MONOCYTES NFR BLD: 0.29 K/UL (ref 0.1–0.8)
MONOCYTES NFR BLD: 5 % (ref 1–7)
MORPHOLOGY: NORMAL
MUCOUS THREADS URNS QL MICRO: ABNORMAL
NEUTROPHILS NFR BLD: 86 % (ref 36–66)
NEUTS SEG NFR BLD: 4.99 K/UL (ref 1.8–7.7)
NITRITE UR QL STRIP: NEGATIVE
NRBC AUTOMATED: 0 PER 100 WBC
PARTIAL THROMBOPLASTIN TIME: 31.2 SEC (ref 23–36.5)
PH UR STRIP: 5.5 [PH] (ref 5–8)
PLATELET # BLD AUTO: 188 K/UL (ref 138–453)
PMV BLD AUTO: 9.1 FL (ref 8.1–13.5)
POTASSIUM SERPL-SCNC: 4.4 MMOL/L (ref 3.7–5.3)
PROT SERPL-MCNC: 7.2 G/DL (ref 6.4–8.3)
PROT UR STRIP-MCNC: NEGATIVE MG/DL
PROTHROMBIN TIME: 15.5 SEC (ref 11.7–14.9)
RBC # BLD AUTO: 3.33 M/UL (ref 4.21–5.77)
RBC #/AREA URNS HPF: ABNORMAL /HPF (ref 0–2)
SODIUM SERPL-SCNC: 133 MMOL/L (ref 135–144)
SP GR UR STRIP: 1.02 (ref 1–1.03)
TROPONIN I SERPL HS-MCNC: 55 NG/L (ref 0–22)
TROPONIN I SERPL HS-MCNC: 58 NG/L (ref 0–22)
UROBILINOGEN UR STRIP-ACNC: NORMAL
WBC #/AREA URNS HPF: ABNORMAL /HPF (ref 0–5)
WBC OTHER # BLD: 5.8 K/UL (ref 3.5–11.3)

## 2023-06-19 PROCEDURE — 87075 CULTR BACTERIA EXCEPT BLOOD: CPT

## 2023-06-19 PROCEDURE — 83605 ASSAY OF LACTIC ACID: CPT

## 2023-06-19 PROCEDURE — 89051 BODY FLUID CELL COUNT: CPT

## 2023-06-19 PROCEDURE — 99222 1ST HOSP IP/OBS MODERATE 55: CPT | Performed by: STUDENT IN AN ORGANIZED HEALTH CARE EDUCATION/TRAINING PROGRAM

## 2023-06-19 PROCEDURE — 83615 LACTATE (LD) (LDH) ENZYME: CPT

## 2023-06-19 PROCEDURE — 85027 COMPLETE CBC AUTOMATED: CPT

## 2023-06-19 PROCEDURE — 36415 COLL VENOUS BLD VENIPUNCTURE: CPT

## 2023-06-19 PROCEDURE — 6370000000 HC RX 637 (ALT 250 FOR IP): Performed by: INTERNAL MEDICINE

## 2023-06-19 PROCEDURE — 70450 CT HEAD/BRAIN W/O DYE: CPT

## 2023-06-19 PROCEDURE — 87040 BLOOD CULTURE FOR BACTERIA: CPT

## 2023-06-19 PROCEDURE — 6360000002 HC RX W HCPCS: Performed by: INTERNAL MEDICINE

## 2023-06-19 PROCEDURE — 84157 ASSAY OF PROTEIN OTHER: CPT

## 2023-06-19 PROCEDURE — 1200000000 HC SEMI PRIVATE

## 2023-06-19 PROCEDURE — 80053 COMPREHEN METABOLIC PANEL: CPT

## 2023-06-19 PROCEDURE — 93005 ELECTROCARDIOGRAM TRACING: CPT | Performed by: STUDENT IN AN ORGANIZED HEALTH CARE EDUCATION/TRAINING PROGRAM

## 2023-06-19 PROCEDURE — 82140 ASSAY OF AMMONIA: CPT

## 2023-06-19 PROCEDURE — 81001 URINALYSIS AUTO W/SCOPE: CPT

## 2023-06-19 PROCEDURE — 82947 ASSAY GLUCOSE BLOOD QUANT: CPT

## 2023-06-19 PROCEDURE — 84484 ASSAY OF TROPONIN QUANT: CPT

## 2023-06-19 PROCEDURE — 87154 CUL TYP ID BLD PTHGN 6+ TRGT: CPT

## 2023-06-19 PROCEDURE — 85610 PROTHROMBIN TIME: CPT

## 2023-06-19 PROCEDURE — 71045 X-RAY EXAM CHEST 1 VIEW: CPT

## 2023-06-19 PROCEDURE — 99285 EMERGENCY DEPT VISIT HI MDM: CPT

## 2023-06-19 PROCEDURE — 85730 THROMBOPLASTIN TIME PARTIAL: CPT

## 2023-06-19 PROCEDURE — 2580000003 HC RX 258: Performed by: INTERNAL MEDICINE

## 2023-06-19 PROCEDURE — 87205 SMEAR GRAM STAIN: CPT

## 2023-06-19 PROCEDURE — 87070 CULTURE OTHR SPECIMN AEROBIC: CPT

## 2023-06-19 RX ORDER — LACTULOSE 10 G/15ML
10 SOLUTION ORAL 3 TIMES DAILY
COMMUNITY

## 2023-06-19 RX ORDER — INSULIN LISPRO 100 [IU]/ML
0-8 INJECTION, SOLUTION INTRAVENOUS; SUBCUTANEOUS
Status: DISCONTINUED | OUTPATIENT
Start: 2023-06-20 | End: 2023-06-24 | Stop reason: HOSPADM

## 2023-06-19 RX ORDER — ACETAMINOPHEN 650 MG/1
650 SUPPOSITORY RECTAL EVERY 6 HOURS PRN
Status: DISCONTINUED | OUTPATIENT
Start: 2023-06-19 | End: 2023-06-24 | Stop reason: HOSPADM

## 2023-06-19 RX ORDER — INSULIN ASPART 100 [IU]/ML
5 INJECTION, SOLUTION INTRAVENOUS; SUBCUTANEOUS
COMMUNITY

## 2023-06-19 RX ORDER — MAGNESIUM SULFATE 1 G/100ML
1000 INJECTION INTRAVENOUS PRN
Status: DISCONTINUED | OUTPATIENT
Start: 2023-06-19 | End: 2023-06-24 | Stop reason: HOSPADM

## 2023-06-19 RX ORDER — POLYETHYLENE GLYCOL 3350 17 G/17G
17 POWDER, FOR SOLUTION ORAL DAILY PRN
Status: DISCONTINUED | OUTPATIENT
Start: 2023-06-19 | End: 2023-06-24 | Stop reason: HOSPADM

## 2023-06-19 RX ORDER — SODIUM CHLORIDE 0.9 % (FLUSH) 0.9 %
5-40 SYRINGE (ML) INJECTION EVERY 12 HOURS SCHEDULED
Status: DISCONTINUED | OUTPATIENT
Start: 2023-06-19 | End: 2023-06-24 | Stop reason: HOSPADM

## 2023-06-19 RX ORDER — ENOXAPARIN SODIUM 100 MG/ML
40 INJECTION SUBCUTANEOUS DAILY
Status: DISCONTINUED | OUTPATIENT
Start: 2023-06-19 | End: 2023-06-21

## 2023-06-19 RX ORDER — SODIUM CHLORIDE 0.9 % (FLUSH) 0.9 %
5-40 SYRINGE (ML) INJECTION PRN
Status: DISCONTINUED | OUTPATIENT
Start: 2023-06-19 | End: 2023-06-24 | Stop reason: HOSPADM

## 2023-06-19 RX ORDER — THIAMINE HYDROCHLORIDE 100 MG/ML
100 INJECTION, SOLUTION INTRAMUSCULAR; INTRAVENOUS DAILY
Status: DISCONTINUED | OUTPATIENT
Start: 2023-06-19 | End: 2023-06-19

## 2023-06-19 RX ORDER — SODIUM CHLORIDE 9 MG/ML
INJECTION, SOLUTION INTRAVENOUS PRN
Status: DISCONTINUED | OUTPATIENT
Start: 2023-06-19 | End: 2023-06-24 | Stop reason: HOSPADM

## 2023-06-19 RX ORDER — ACETAMINOPHEN 325 MG/1
650 TABLET ORAL EVERY 6 HOURS PRN
Status: DISCONTINUED | OUTPATIENT
Start: 2023-06-19 | End: 2023-06-24 | Stop reason: HOSPADM

## 2023-06-19 RX ORDER — LACTULOSE 10 G/15ML
10 SOLUTION ORAL 3 TIMES DAILY
Status: DISCONTINUED | OUTPATIENT
Start: 2023-06-19 | End: 2023-06-20

## 2023-06-19 RX ORDER — SODIUM CHLORIDE 9 MG/ML
INJECTION, SOLUTION INTRAVENOUS CONTINUOUS
Status: ACTIVE | OUTPATIENT
Start: 2023-06-19 | End: 2023-06-20

## 2023-06-19 RX ORDER — PANTOPRAZOLE SODIUM 40 MG/1
40 TABLET, DELAYED RELEASE ORAL
Status: DISCONTINUED | OUTPATIENT
Start: 2023-06-20 | End: 2023-06-24 | Stop reason: HOSPADM

## 2023-06-19 RX ORDER — ONDANSETRON 4 MG/1
4 TABLET, ORALLY DISINTEGRATING ORAL EVERY 8 HOURS PRN
Status: DISCONTINUED | OUTPATIENT
Start: 2023-06-19 | End: 2023-06-24 | Stop reason: HOSPADM

## 2023-06-19 RX ORDER — DEXTROSE MONOHYDRATE 100 MG/ML
INJECTION, SOLUTION INTRAVENOUS CONTINUOUS PRN
Status: DISCONTINUED | OUTPATIENT
Start: 2023-06-19 | End: 2023-06-24 | Stop reason: HOSPADM

## 2023-06-19 RX ORDER — SODIUM CHLORIDE 0.9 % (FLUSH) 0.9 %
10 SYRINGE (ML) INJECTION PRN
Status: DISCONTINUED | OUTPATIENT
Start: 2023-06-19 | End: 2023-06-24 | Stop reason: HOSPADM

## 2023-06-19 RX ORDER — OMEPRAZOLE 40 MG/1
40 CAPSULE, DELAYED RELEASE ORAL DAILY
COMMUNITY

## 2023-06-19 RX ORDER — MIDODRINE HYDROCHLORIDE 2.5 MG/1
2.5 TABLET ORAL 2 TIMES DAILY
Status: DISCONTINUED | OUTPATIENT
Start: 2023-06-19 | End: 2023-06-24 | Stop reason: HOSPADM

## 2023-06-19 RX ORDER — ONDANSETRON 2 MG/ML
4 INJECTION INTRAMUSCULAR; INTRAVENOUS EVERY 6 HOURS PRN
Status: DISCONTINUED | OUTPATIENT
Start: 2023-06-19 | End: 2023-06-24 | Stop reason: HOSPADM

## 2023-06-19 RX ORDER — VITAMIN B COMPLEX
2000 TABLET ORAL WEEKLY
Status: DISCONTINUED | OUTPATIENT
Start: 2023-06-19 | End: 2023-06-19

## 2023-06-19 RX ORDER — INSULIN LISPRO 100 [IU]/ML
0-4 INJECTION, SOLUTION INTRAVENOUS; SUBCUTANEOUS NIGHTLY
Status: DISCONTINUED | OUTPATIENT
Start: 2023-06-19 | End: 2023-06-24 | Stop reason: HOSPADM

## 2023-06-19 RX ORDER — POTASSIUM CHLORIDE 20 MEQ/1
40 TABLET, EXTENDED RELEASE ORAL PRN
Status: DISCONTINUED | OUTPATIENT
Start: 2023-06-19 | End: 2023-06-24 | Stop reason: HOSPADM

## 2023-06-19 RX ORDER — INSULIN GLARGINE 100 [IU]/ML
38 INJECTION, SOLUTION SUBCUTANEOUS NIGHTLY
COMMUNITY

## 2023-06-19 RX ORDER — POTASSIUM CHLORIDE 7.45 MG/ML
10 INJECTION INTRAVENOUS PRN
Status: DISCONTINUED | OUTPATIENT
Start: 2023-06-19 | End: 2023-06-24 | Stop reason: HOSPADM

## 2023-06-19 RX ORDER — VITAMIN B COMPLEX
2000 TABLET ORAL WEEKLY
Status: DISCONTINUED | OUTPATIENT
Start: 2023-06-21 | End: 2023-06-24 | Stop reason: HOSPADM

## 2023-06-19 RX ADMIN — SODIUM CHLORIDE, PRESERVATIVE FREE 10 ML: 5 INJECTION INTRAVENOUS at 22:43

## 2023-06-19 RX ADMIN — LACTULOSE 10 G: 20 SOLUTION ORAL at 22:42

## 2023-06-19 RX ADMIN — ENOXAPARIN SODIUM 40 MG: 40 INJECTION SUBCUTANEOUS at 23:41

## 2023-06-19 RX ADMIN — SODIUM CHLORIDE: 9 INJECTION, SOLUTION INTRAVENOUS at 23:01

## 2023-06-19 RX ADMIN — RIFAXIMIN 400 MG: 200 TABLET ORAL at 22:44

## 2023-06-19 RX ADMIN — MIDODRINE HYDROCHLORIDE 2.5 MG: 2.5 TABLET ORAL at 22:42

## 2023-06-19 ASSESSMENT — PAIN - FUNCTIONAL ASSESSMENT: PAIN_FUNCTIONAL_ASSESSMENT: NONE - DENIES PAIN

## 2023-06-19 NOTE — ED PROVIDER NOTES
[address] [facility]     Emergency Department     Faculty Attestation  2:27 PM EDT      I performed a history and physical examination of the patient and discussed management with the resident. I have reviewed and agree with the residents findings including all diagnostic interpretations, and treatment plans as written. Any areas of disagreement are noted on the chart. I was personally present for the key portions of any procedures. I have documented in the chart those procedures where I was not present during the key portions. I have reviewed the emergency nurses triage note. I agree with the chief complaint, past medical history, past surgical history, allergies, medications, social and family history as documented unless otherwise noted below. Documentation of the HPI, Physical Exam and Medical Decision Making performed by yash is based on my personal performance of the HPI, PE and MDM. For Physician Assistant/ Nurse Practitioner cases/documentation I have personally evaluated this patient and have completed at least one if not all key elements of the E/M (history, physical exam, and MDM). Additional findings are as noted. Primary Care Physician: Krissy Hay MD    Patient with history of liver cirrhosis, requires weekly paracentesis. Is compliant with his medications, brought in by significant other and family member after over the last week and a half he appears to be getting more weaker, and to having some confusion. Symptoms seem to be worse at night. He is tolerating oral intake. But decreased p.o. intake overall per family member. He is awake and answering questions appropriately, moves all extremities equally and no neurodeficits on exam he does have a distended abdomen with positive fluid wave that is nontender to palpation. He is scheduled for paracentesis tomorrow at [facility]   His significant other does report that he goes through

## 2023-06-19 NOTE — ED TRIAGE NOTES
Pt awaiting double organ transplant at the [address] [place] [address], pt complains of fatigue and body aches

## 2023-06-19 NOTE — ED TRIAGE NOTES
Patient was brought in by family for altered mental status, for about 1 week the girlfriend. Has noticed urine is more dark in color, small odor, and frequency.      Family reports that patient needs help with ambulation, as the goes goes on his balance becomes worse, family also reports fatigue and generalized body aches

## 2023-06-19 NOTE — ED PROVIDER NOTES
[facility]  FACULTY HANDOFF       Handoff taken on the following patient from prior Attending Physician:  Pt Name: Geovanny Fowlerook  PCP:  Nikky Hoffmann MD    Attestation  I was available and discussed any additional care issues that arose and coordinated the management plans with the resident(s) caring for the patient during my duty period. Any areas of disagreement with resident's documentation of care or procedures are noted on the chart. I was personally present for the key portions of any/all procedures during my duty period. I have documented in the chart those procedures where I was not present during the key portions.            Sonam Murillo MD  06/19/23 8166

## 2023-06-19 NOTE — ED PROVIDER NOTES
[facility] [facility]  Emergency Department Encounter  Emergency Medicine Resident     Pt Kristie Prado  MRN: [id number]  [place] 1955  Date of evaluation: 6/19/23  PCP:  Latrell Valdes MD  Note Started: 1:48 PM EDT      CHIEF COMPLAINT       Chief Complaint   Patient presents with    Generalized Body Aches    Fatigue    Other     Pt waiting for double transplant, liver and kidney    Altered Mental Status    Urinary Frequency       HISTORY OF PRESENT ILLNESS  (Location/Symptom, Timing/Onset, Context/Setting, Quality, Duration, Modifying Factors, Severity.)      Marcio Kraft is a 75 y.o. male who presents with generalized weakness, altered mental status. Patient has been having the symptoms for several days to weeks now. 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 Patient also has CKD IIIb. Patient does still make urine. Patient at bedside is pleasantly demented. Patient is not complaining of any pain. Family at bedside does state that the patient has become increasingly altered from his baseline dementia. Patient is not in any acute distress at bedside. His abdomen is more distended, he is due for dialysis tomorrow at [facility] Patient's family did state that he has been admitted here for inpatient therapeutic paracenteses. PAST MEDICAL / SURGICAL / SOCIAL / FAMILY HISTORY      has no past medical history on file. has a past surgical history that includes Total shoulder arthroplasty (Right, 2013); Finger amputation (Left, 06/06/2020); and Tonsillectomy (1964). Social History     Socioeconomic History    Marital status: Unknown     Spouse name: Not on file    Number of children: Not on file    Years of education: Not on file    Highest education level: Not on file   Occupational History    Not on file   Tobacco Use    Smoking status: Never    Smokeless tobacco: Never   Substance and Sexual Activity    Alcohol use:  Yes     Alcohol/week: 0.0 standard drinks     Comment: very

## 2023-06-20 ENCOUNTER — APPOINTMENT (OUTPATIENT)
Dept: ULTRASOUND IMAGING | Age: 68
End: 2023-06-20
Payer: MEDICARE

## 2023-06-20 LAB
ALBUMIN SERPL-MCNC: 2.4 G/DL (ref 3.5–5.2)
ALBUMIN/GLOB SERPL: 0.6 {RATIO} (ref 1–2.5)
ALP SERPL-CCNC: 242 U/L (ref 40–129)
ALT SERPL-CCNC: 29 U/L (ref 5–41)
AMMONIA PLAS-SCNC: 90 UMOL/L (ref 16–60)
ANION GAP SERPL CALCULATED.3IONS-SCNC: 11 MMOL/L (ref 9–17)
AST SERPL-CCNC: 31 U/L
BASOPHILS # BLD: 0.05 K/UL (ref 0–0.2)
BASOPHILS NFR BLD: 1 % (ref 0–2)
BILIRUB SERPL-MCNC: 2.4 MG/DL (ref 0.3–1.2)
BUN SERPL-MCNC: 40 MG/DL (ref 8–23)
CALCIUM SERPL-MCNC: 8.8 MG/DL (ref 8.6–10.4)
CHLORIDE SERPL-SCNC: 100 MMOL/L (ref 98–107)
CO2 SERPL-SCNC: 22 MMOL/L (ref 20–31)
CREAT SERPL-MCNC: 1.73 MG/DL (ref 0.7–1.2)
EKG ATRIAL RATE: 69 BPM
EKG P AXIS: 2 DEGREES
EKG P-R INTERVAL: 142 MS
EKG Q-T INTERVAL: 444 MS
EKG QRS DURATION: 118 MS
EKG QTC CALCULATION (BAZETT): 475 MS
EKG R AXIS: 9 DEGREES
EKG T AXIS: 37 DEGREES
EKG VENTRICULAR RATE: 69 BPM
EOSINOPHIL # BLD: 0 K/UL (ref 0–0.4)
EOSINOPHILS RELATIVE PERCENT: 0 % (ref 1–4)
ERYTHROCYTE [DISTWIDTH] IN BLOOD BY AUTOMATED COUNT: 14 % (ref 11.8–14.4)
GFR SERPL CREATININE-BSD FRML MDRD: 42 ML/MIN/1.73M2
GLUCOSE BLD-MCNC: 146 MG/DL (ref 75–110)
GLUCOSE BLD-MCNC: 152 MG/DL (ref 75–110)
GLUCOSE BLD-MCNC: 233 MG/DL (ref 75–110)
GLUCOSE SERPL-MCNC: 174 MG/DL (ref 70–99)
HCT VFR BLD AUTO: 30.8 % (ref 40.7–50.3)
HGB BLD-MCNC: 10 G/DL (ref 13–17)
IMM GRANULOCYTES # BLD AUTO: 0.05 K/UL (ref 0–0.3)
IMM GRANULOCYTES NFR BLD: 1 %
LDH FLD L TO P-CCNC: 53 U/L
LIPASE SERPL-CCNC: 91 U/L (ref 13–60)
LYMPHOCYTES # BLD: 16 % (ref 24–44)
LYMPHOCYTES NFR BLD: 0.72 K/UL (ref 1–4.8)
MAGNESIUM SERPL-MCNC: 2 MG/DL (ref 1.6–2.6)
MCH RBC QN AUTO: 33.2 PG (ref 25.2–33.5)
MCHC RBC AUTO-ENTMCNC: 32.5 G/DL (ref 28.4–34.8)
MCV RBC AUTO: 102.3 FL (ref 82.6–102.9)
MONOCYTES NFR BLD: 0.27 K/UL (ref 0.1–0.8)
MONOCYTES NFR BLD: 6 % (ref 1–7)
MORPHOLOGY: NORMAL
NEUTROPHILS NFR BLD: 76 % (ref 36–66)
NEUTS SEG NFR BLD: 3.41 K/UL (ref 1.8–7.7)
NRBC AUTOMATED: 0 PER 100 WBC
PLATELET # BLD AUTO: 145 K/UL (ref 138–453)
PMV BLD AUTO: 8.8 FL (ref 8.1–13.5)
POTASSIUM SERPL-SCNC: 4.4 MMOL/L (ref 3.7–5.3)
PROT FLD-MCNC: 1.1 G/DL
PROT SERPL-MCNC: 6.2 G/DL (ref 6.4–8.3)
RBC # BLD AUTO: 3.01 M/UL (ref 4.21–5.77)
SODIUM SERPL-SCNC: 133 MMOL/L (ref 135–144)
SPECIMEN TYPE: NORMAL
SPECIMEN TYPE: NORMAL
WBC OTHER # BLD: 4.5 K/UL (ref 3.5–11.3)

## 2023-06-20 PROCEDURE — 6360000002 HC RX W HCPCS: Performed by: INTERNAL MEDICINE

## 2023-06-20 PROCEDURE — 80053 COMPREHEN METABOLIC PANEL: CPT

## 2023-06-20 PROCEDURE — 82140 ASSAY OF AMMONIA: CPT

## 2023-06-20 PROCEDURE — 6360000002 HC RX W HCPCS: Performed by: PHYSICIAN ASSISTANT

## 2023-06-20 PROCEDURE — 2709999900 US GUIDED PARACENTESIS

## 2023-06-20 PROCEDURE — 99232 SBSQ HOSP IP/OBS MODERATE 35: CPT | Performed by: INTERNAL MEDICINE

## 2023-06-20 PROCEDURE — P9047 ALBUMIN (HUMAN), 25%, 50ML: HCPCS | Performed by: PHYSICIAN ASSISTANT

## 2023-06-20 PROCEDURE — 87086 URINE CULTURE/COLONY COUNT: CPT

## 2023-06-20 PROCEDURE — 6370000000 HC RX 637 (ALT 250 FOR IP): Performed by: INTERNAL MEDICINE

## 2023-06-20 PROCEDURE — 83735 ASSAY OF MAGNESIUM: CPT

## 2023-06-20 PROCEDURE — 85027 COMPLETE CBC AUTOMATED: CPT

## 2023-06-20 PROCEDURE — 0W9G3ZZ DRAINAGE OF PERITONEAL CAVITY, PERCUTANEOUS APPROACH: ICD-10-PCS | Performed by: RADIOLOGY

## 2023-06-20 PROCEDURE — 2580000003 HC RX 258: Performed by: INTERNAL MEDICINE

## 2023-06-20 PROCEDURE — 83690 ASSAY OF LIPASE: CPT

## 2023-06-20 PROCEDURE — 36415 COLL VENOUS BLD VENIPUNCTURE: CPT

## 2023-06-20 PROCEDURE — 6370000000 HC RX 637 (ALT 250 FOR IP): Performed by: NURSE PRACTITIONER

## 2023-06-20 PROCEDURE — 1200000000 HC SEMI PRIVATE

## 2023-06-20 PROCEDURE — 82947 ASSAY GLUCOSE BLOOD QUANT: CPT

## 2023-06-20 RX ORDER — ALBUMIN (HUMAN) 12.5 G/50ML
75 SOLUTION INTRAVENOUS ONCE
Status: COMPLETED | OUTPATIENT
Start: 2023-06-20 | End: 2023-06-20

## 2023-06-20 RX ORDER — LACTULOSE 10 G/15ML
20 SOLUTION ORAL 3 TIMES DAILY
Status: DISCONTINUED | OUTPATIENT
Start: 2023-06-20 | End: 2023-06-21

## 2023-06-20 RX ORDER — MIDODRINE HYDROCHLORIDE 5 MG/1
5 TABLET ORAL ONCE
Status: COMPLETED | OUTPATIENT
Start: 2023-06-20 | End: 2023-06-20

## 2023-06-20 RX ADMIN — LACTULOSE 10 G: 20 SOLUTION ORAL at 07:58

## 2023-06-20 RX ADMIN — SODIUM CHLORIDE, PRESERVATIVE FREE 10 ML: 5 INJECTION INTRAVENOUS at 08:00

## 2023-06-20 RX ADMIN — MIDODRINE HYDROCHLORIDE 5 MG: 5 TABLET ORAL at 23:32

## 2023-06-20 RX ADMIN — THIAMINE HYDROCHLORIDE 100 MG: 100 INJECTION, SOLUTION INTRAMUSCULAR; INTRAVENOUS at 23:40

## 2023-06-20 RX ADMIN — INSULIN LISPRO 2 UNITS: 100 INJECTION, SOLUTION INTRAVENOUS; SUBCUTANEOUS at 17:11

## 2023-06-20 RX ADMIN — THIAMINE HYDROCHLORIDE 100 MG: 100 INJECTION, SOLUTION INTRAMUSCULAR; INTRAVENOUS at 00:04

## 2023-06-20 RX ADMIN — MIDODRINE HYDROCHLORIDE 2.5 MG: 2.5 TABLET ORAL at 21:10

## 2023-06-20 RX ADMIN — SODIUM CHLORIDE, PRESERVATIVE FREE 10 ML: 5 INJECTION INTRAVENOUS at 21:14

## 2023-06-20 RX ADMIN — ALBUMIN (HUMAN) 75 G: 12.5 SOLUTION INTRAVENOUS at 10:00

## 2023-06-20 RX ADMIN — LACTULOSE 20 G: 20 SOLUTION ORAL at 21:10

## 2023-06-20 RX ADMIN — PANTOPRAZOLE SODIUM 40 MG: 40 TABLET, DELAYED RELEASE ORAL at 08:00

## 2023-06-20 RX ADMIN — RIFAXIMIN 400 MG: 200 TABLET ORAL at 21:10

## 2023-06-20 RX ADMIN — MIDODRINE HYDROCHLORIDE 2.5 MG: 2.5 TABLET ORAL at 07:58

## 2023-06-20 RX ADMIN — RIFAXIMIN 400 MG: 200 TABLET ORAL at 07:58

## 2023-06-20 RX ADMIN — LACTULOSE 10 G: 20 SOLUTION ORAL at 14:06

## 2023-06-20 NOTE — CARE COORDINATION
Case Management Assessment  Initial Evaluation    Date/Time of Evaluation: 6/20/2023 4:15 PM  Assessment Completed by: Irwin Luis RN    If patient is discharged prior to next notation, then this note serves as note for discharge by case management. Patient Name: Raul Hicks                   YOB: 1955  Diagnosis: Encephalopathy acute [G93.40]  General weakness [R53.1]                   Date / Time: 6/19/2023  1:32 PM    Patient Admission Status: Inpatient   Readmission Risk (Low < 19, Mod (19-27), High > 27): Readmission Risk Score: 18.4    Current PCP: Gasper Davies MD  PCP verified by CM? Yes    Chart Reviewed: Yes      History Provided by: Patient  Patient Orientation: Alert and Oriented    Patient Cognition: Alert    Hospitalization in the last 30 days (Readmission):  No    If yes, Readmission Assessment in CM Navigator will be completed. Advance Directives:      Code Status: Full Code   Patient's Primary Decision Maker is: Named in [address] [place] [address]    Primary Decision Maker: Fatmata Diaz - Girlfriend - [phone]    Secondary Decision Maker: Veronica Traylor Child - [phone]    Discharge Planning:    Patient lives with: Spouse/Significant Other Type of Home: [place]  Primary Care Giver: Other (Comment) (S/O)  Patient Support Systems include: Spouse/Significant Other   Current Financial resources: Medicare  Current community resources: None  Current services prior to admission: None            Current DME:              Type of Home Care services:  None    ADLS  Prior functional level: Independent in ADLs/IADLs  Current functional level: Other (see comment) (OSCAR- Await PT/OT Eval)    PT AM-PAC:   /24  OT AM-PAC:   /24    Family can provide assistance at DC: Yes  Would you like Case Management to discuss the discharge plan with any other family members/significant others, and if so, who?     Plans to Return to Present Housing:

## 2023-06-20 NOTE — ED NOTES
Family updated on plan of care  The following labs were labeled with appropriate pt sticker and tubed to lab:     [] Blue     [] Lavender   [] on ice  [x] Green/yellow  [] Green/black [] on ice  [] Allen Vinnie  [] on ice  [] Yellow  [] Red  [] Type/ Screen  [] ABG  [] VBG    [] COVID-19 swab    [] Rapid  [] PCR  [] Flu swab  [] Peds Viral Panel     [] Urine Sample  [] Fecal Sample  [] Pelvic Cultures  [] Blood Cultures  [] X 2  [] STREP Cultures       Alec Espinal RN  06/19/23 2858
The following labs were labeled with appropriate pt sticker and tubed to lab:     [] Blue     [] Lavender   [] on ice  [] Green/yellow  [] Green/black [] on ice  [] Katerin Friday  [] on ice  [] Yellow  [] Red  [] Type/ Screen  [] ABG  [] VBG    [] COVID-19 swab    [] Rapid  [] PCR  [] Flu swab  [] Peds Viral Panel     [x] Urine Sample  [] Fecal Sample  [] Pelvic Cultures  [] Blood Cultures  [] X 2  [] STREP Cultures       Jamie Santoyo RN  06/19/23 8831
The following labs were labeled with appropriate pt sticker and tubed to lab:     [] Blue     [] Skeltonrhett Phan   [] on ice  [] Green/yellow  [] Green/black [] on ice  [] Jerel Cayuga  [x] on ice  [x] Yellow  [] Red  [] Type/ Screen  [] ABG  [] VBG    [] COVID-19 swab    [] Rapid  [] PCR  [] Flu swab  [] Peds Viral Panel     [] Urine Sample  [] Fecal Sample  [] Pelvic Cultures  [] Blood Cultures  [] X 2  [] STREP Cultures       Jesús Kaur RN  06/19/23 0465
Verbal report to Rhoda Cronin, Patient will be wheel chaired to floor  All personal belongs sent with the patient      Kaz Tamayo RN  06/19/23 2047
Diagnosis:  DISPOSITION Decision To Admit 06/19/2023 04:55:43 PM   Final diagnoses:   General weakness        Code Status: [unfilled] full code    Consults:  []  Hospitalist  Completed  [] yes [] no  [x]  Medicine  Completed  [x] yes [] No  []  Cardiology  Completed  [] yes [] No  []  GI   Completed  [] yes [] No  []  Neurology  Completed  [] yes [] No  []  Nephrology Completed  [] yes [] No  []  Vascular  Completed  [] yes [] No   []  Surgery  Completed  [] yes [] No   []  Urology  Completed  [] yes [] No   []  Plastics  Completed  [] yes [] No   []  ENT  Completed  [] yes [] No   []  Other:  Completed  [] yes [] No    Consults:  IP CONSULT TO INTERNAL MEDICINE     Treatment Team:   Treatment Team: Attending Provider: Emeka Zurita MD; Resident: Adamaris Fontanez DO; Registered Nurse: Abdifatah Kincaid RN    Treatment:  ED Course as of 06/19/23 1708   Mon Jun 19, 2023   1405 Russell Huerta, CKD. Has increased abdominal distention. [KK]   1407 We will order basic labs, ammonia level. At bedside patient is pleasantly demented, not oriented to place or time. He is oriented to self. Hemodynamically stable at bedside afebrile nontoxic-appearing. Abdomen is distended. Patient is scheduled for therapeutic paracentesis tomorrow at [facility] however family at bedside claims that they will not be able to make it. [KK]   1434 Lactic Acid, Whole Blood: 2.0 [KK]      ED Course User Index  [KK] Adamaris Fontanez DO              Skin Assessment:        Pain Score:  Pain Assessment  Pain Assessment: None - Denies Pain      SOCIAL HISTORY       Social History     Socioeconomic History    Marital status: Unknown     Spouse name: None    Number of children: None    Years of education: None    Highest education level: None   Tobacco Use    Smoking status: Never    Smokeless tobacco: Never   Substance and Sexual Activity    Alcohol use:  Yes     Alcohol/week: 0.0 standard drinks     Comment: very rarely    Drug use: No       FAMILY HISTORY

## 2023-06-20 NOTE — BRIEF OP NOTE
Brief Postoperative Note for Paracentesis    Ada Kitchen  YOB: 1955  [id number]    Pre-operative Diagnosis:  Ascites     Post-operative Diagnosis: Same    Procedure: Ultrasound guided Paracentesis     Anesthesia: 1% Lidocaine     Surgeons/Assistants: Britany Morillo PA-C    Complications: none    EBL: Minimal    Specimens: Were obtained    Ultrasound guided paracentesis performed. 9550 ml clear yellow fluid obtained. Dressing applied.      Electronically signed by ANDRIA Regalado on 6/20/2023 at 10:25 AM

## 2023-06-20 NOTE — H&P
Diabetes Mother     Lung Cancer Brother        Review of Systems:     Positive and Negative as described in HPI.     CONSTITUTIONAL:  negative for fevers, chills, sweats, fatigue, weight loss  HEENT:  negative for vision, hearing changes, runny nose, throat pain  RESPIRATORY:  negative for shortness of breath, cough, congestion, wheezing  CARDIOVASCULAR:  negative for chest pain, palpitations  GASTROINTESTINAL:  negative for nausea, vomiting, diarrhea, constipation, change in bowel habits, abdominal pain , distention  GENITOURINARY:  negative for difficulty of urination, burning with urination, frequency   INTEGUMENT:  negative for rash, skin lesions, easy bruising   HEMATOLOGIC/LYMPHATIC:  negative for swelling/edema   ALLERGIC/IMMUNOLOGIC:  negative for urticaria , itching  ENDOCRINE:  negative increase in drinking, increase in urination, hot or cold intolerance  MUSCULOSKELETAL:  negative joint pains, muscle aches, swelling of joints  NEUROLOGICAL:  negative for headaches, dizziness, lightheadedness, numbness, pain, tingling extremities  BEHAVIOR/PSYCH:  negative for depression, anxiety    Physical Exam:   /78   Pulse 80   Temp 98.1 °F (36.7 °C) (Oral)   Resp 12   Ht 6' (1.829 m)   Wt 168 lb (76.2 kg)   SpO2 99%   BMI 22.78 kg/m²   Temp (24hrs), Av.4 °F (36.9 °C), Min:98.1 °F (36.7 °C), Max:98.7 °F (37.1 °C)    Recent Labs     23  1358   POCGLU 166*     No intake or output data in the 24 hours ending 23    General Appearance: alert, well appearing, and in no acute distress  Mental status: oriented to person, place, and time  Head: normocephalic, atraumatic  Eye: no icterus, redness, pupils equal and reactive, extraocular eye movements intact, conjunctiva clear  Ear: normal external ear, no discharge, hearing intact  Nose: no drainage noted  Mouth: mucous membranes moist  Neck: supple, no carotid bruits, thyroid not palpable  Lungs: Bilateral equal air entry, clear to

## 2023-06-20 NOTE — PLAN OF CARE
Problem: Discharge Planning  Goal: Discharge to home or other facility with appropriate resources  6/20/2023 1707 by Ed Nevarez RN  Outcome: Progressing  6/20/2023 0310 by Nasir Forbes RN  Outcome: Progressing     Problem: Safety - Adult  Goal: Free from fall injury  6/20/2023 1707 by Ed Nevarez RN  Outcome: Progressing  6/20/2023 0310 by Nasir Forbes RN  Outcome: Progressing

## 2023-06-21 ENCOUNTER — APPOINTMENT (OUTPATIENT)
Dept: GENERAL RADIOLOGY | Age: 68
End: 2023-06-21
Payer: MEDICARE

## 2023-06-21 LAB
ALBUMIN SERPL-MCNC: 2.4 G/DL (ref 3.5–5.2)
ALBUMIN/GLOB SERPL: 0.6 {RATIO} (ref 1–2.5)
ALP SERPL-CCNC: 247 U/L (ref 40–129)
ALT SERPL-CCNC: 30 U/L (ref 5–41)
AMMONIA PLAS-SCNC: 106 UMOL/L (ref 16–60)
AMMONIA PLAS-SCNC: 143 UMOL/L (ref 16–60)
ANION GAP SERPL CALCULATED.3IONS-SCNC: 12 MMOL/L (ref 9–17)
AST SERPL-CCNC: 38 U/L
BILIRUB SERPL-MCNC: 2.3 MG/DL (ref 0.3–1.2)
BODY FLD TYPE: NORMAL
BUN SERPL-MCNC: 42 MG/DL (ref 8–23)
CALCIUM SERPL-MCNC: 8.7 MG/DL (ref 8.6–10.4)
CHLORIDE SERPL-SCNC: 100 MMOL/L (ref 98–107)
CO2 SERPL-SCNC: 21 MMOL/L (ref 20–31)
CREAT SERPL-MCNC: 1.89 MG/DL (ref 0.7–1.2)
EST. AVERAGE GLUCOSE BLD GHB EST-MCNC: 143 MG/DL
GFR SERPL CREATININE-BSD FRML MDRD: 38 ML/MIN/1.73M2
GGT SERPL-CCNC: 169 U/L (ref 8–61)
GLUCOSE BLD-MCNC: 192 MG/DL (ref 75–110)
GLUCOSE BLD-MCNC: 199 MG/DL (ref 75–110)
GLUCOSE BLD-MCNC: 204 MG/DL (ref 75–110)
GLUCOSE BLD-MCNC: 209 MG/DL (ref 75–110)
GLUCOSE BLD-MCNC: 215 MG/DL (ref 75–110)
GLUCOSE SERPL-MCNC: 221 MG/DL (ref 70–99)
HBA1C MFR BLD: 6.6 % (ref 4–6)
INR PPP: 1.3
IRON SATN MFR SERPL: 73 % (ref 20–55)
IRON SERPL-MCNC: 83 UG/DL (ref 59–158)
LYMPHOCYTES NFR FLD: 22 %
MICROORGANISM SPEC CULT: NO GROWTH
NEUTROPHILS NFR FLD: 0 %
POTASSIUM SERPL-SCNC: 4.9 MMOL/L (ref 3.7–5.3)
PROCALCITONIN SERPL-MCNC: 0.36 NG/ML
PROT SERPL-MCNC: 6.1 G/DL (ref 6.4–8.3)
PROTHROMBIN TIME: 16.4 SEC (ref 11.7–14.9)
RBC # FLD: <3000 CELLS/UL
SODIUM SERPL-SCNC: 133 MMOL/L (ref 135–144)
SPECIMEN DESCRIPTION: NORMAL
TIBC SERPL-MCNC: 113 UG/DL (ref 250–450)
UNIDENT CELLS NFR FLD: NORMAL %
UNSATURATED IRON BINDING CAPACITY: 30 UG/DL (ref 112–347)
WBC # FLD: 32 CELLS/UL

## 2023-06-21 PROCEDURE — 51798 US URINE CAPACITY MEASURE: CPT

## 2023-06-21 PROCEDURE — 6370000000 HC RX 637 (ALT 250 FOR IP): Performed by: INTERNAL MEDICINE

## 2023-06-21 PROCEDURE — 83036 HEMOGLOBIN GLYCOSYLATED A1C: CPT

## 2023-06-21 PROCEDURE — 2580000003 HC RX 258: Performed by: INTERNAL MEDICINE

## 2023-06-21 PROCEDURE — 6360000002 HC RX W HCPCS: Performed by: INTERNAL MEDICINE

## 2023-06-21 PROCEDURE — 6370000000 HC RX 637 (ALT 250 FOR IP): Performed by: NURSE PRACTITIONER

## 2023-06-21 PROCEDURE — 83550 IRON BINDING TEST: CPT

## 2023-06-21 PROCEDURE — 80053 COMPREHEN METABOLIC PANEL: CPT

## 2023-06-21 PROCEDURE — 99232 SBSQ HOSP IP/OBS MODERATE 35: CPT | Performed by: INTERNAL MEDICINE

## 2023-06-21 PROCEDURE — 74018 RADEX ABDOMEN 1 VIEW: CPT

## 2023-06-21 PROCEDURE — 97162 PT EVAL MOD COMPLEX 30 MIN: CPT

## 2023-06-21 PROCEDURE — 87040 BLOOD CULTURE FOR BACTERIA: CPT

## 2023-06-21 PROCEDURE — 84145 PROCALCITONIN (PCT): CPT

## 2023-06-21 PROCEDURE — 97530 THERAPEUTIC ACTIVITIES: CPT

## 2023-06-21 PROCEDURE — 82977 ASSAY OF GGT: CPT

## 2023-06-21 PROCEDURE — 1200000000 HC SEMI PRIVATE

## 2023-06-21 PROCEDURE — 97167 OT EVAL HIGH COMPLEX 60 MIN: CPT

## 2023-06-21 PROCEDURE — 83540 ASSAY OF IRON: CPT

## 2023-06-21 PROCEDURE — 36415 COLL VENOUS BLD VENIPUNCTURE: CPT

## 2023-06-21 PROCEDURE — 85610 PROTHROMBIN TIME: CPT

## 2023-06-21 PROCEDURE — 82140 ASSAY OF AMMONIA: CPT

## 2023-06-21 RX ORDER — SODIUM CHLORIDE 450 MG/100ML
INJECTION, SOLUTION INTRAVENOUS CONTINUOUS
Status: DISCONTINUED | OUTPATIENT
Start: 2023-06-21 | End: 2023-06-23

## 2023-06-21 RX ORDER — LACTULOSE 10 G/15ML
30 SOLUTION ORAL
Status: DISPENSED | OUTPATIENT
Start: 2023-06-21 | End: 2023-06-22

## 2023-06-21 RX ORDER — ASCORBIC ACID 500 MG
500 TABLET ORAL 2 TIMES DAILY
Status: DISCONTINUED | OUTPATIENT
Start: 2023-06-21 | End: 2023-06-24 | Stop reason: HOSPADM

## 2023-06-21 RX ORDER — LACTULOSE 10 G/15ML
20 SOLUTION ORAL EVERY 6 HOURS SCHEDULED
Status: DISCONTINUED | OUTPATIENT
Start: 2023-06-21 | End: 2023-06-21

## 2023-06-21 RX ORDER — HEPARIN SODIUM 5000 [USP'U]/ML
5000 INJECTION, SOLUTION INTRAVENOUS; SUBCUTANEOUS EVERY 8 HOURS SCHEDULED
Status: DISCONTINUED | OUTPATIENT
Start: 2023-06-21 | End: 2023-06-24 | Stop reason: HOSPADM

## 2023-06-21 RX ORDER — INSULIN GLARGINE 100 [IU]/ML
18 INJECTION, SOLUTION SUBCUTANEOUS NIGHTLY
Status: DISCONTINUED | OUTPATIENT
Start: 2023-06-21 | End: 2023-06-22

## 2023-06-21 RX ADMIN — ONDANSETRON 4 MG: 2 INJECTION INTRAMUSCULAR; INTRAVENOUS at 21:50

## 2023-06-21 RX ADMIN — INSULIN GLARGINE 18 UNITS: 100 INJECTION, SOLUTION SUBCUTANEOUS at 20:08

## 2023-06-21 RX ADMIN — LACTULOSE 30 G: 20 SOLUTION ORAL at 15:18

## 2023-06-21 RX ADMIN — LACTULOSE 20 G: 20 SOLUTION ORAL at 12:18

## 2023-06-21 RX ADMIN — HEPARIN SODIUM 5000 UNITS: 5000 INJECTION INTRAVENOUS; SUBCUTANEOUS at 15:18

## 2023-06-21 RX ADMIN — RIFAXIMIN 400 MG: 200 TABLET ORAL at 15:17

## 2023-06-21 RX ADMIN — MIDODRINE HYDROCHLORIDE 2.5 MG: 2.5 TABLET ORAL at 08:46

## 2023-06-21 RX ADMIN — PANTOPRAZOLE SODIUM 40 MG: 40 TABLET, DELAYED RELEASE ORAL at 06:24

## 2023-06-21 RX ADMIN — SODIUM CHLORIDE, PRESERVATIVE FREE 10 ML: 5 INJECTION INTRAVENOUS at 08:52

## 2023-06-21 RX ADMIN — ENOXAPARIN SODIUM 40 MG: 40 INJECTION SUBCUTANEOUS at 08:46

## 2023-06-21 RX ADMIN — SODIUM CHLORIDE: 4.5 INJECTION, SOLUTION INTRAVENOUS at 14:12

## 2023-06-21 RX ADMIN — LACTULOSE 30 G: 20 SOLUTION ORAL at 19:45

## 2023-06-21 RX ADMIN — HEPARIN SODIUM 5000 UNITS: 5000 INJECTION INTRAVENOUS; SUBCUTANEOUS at 20:08

## 2023-06-21 RX ADMIN — LACTULOSE 30 G: 20 SOLUTION ORAL at 22:44

## 2023-06-21 RX ADMIN — INSULIN LISPRO 2 UNITS: 100 INJECTION, SOLUTION INTRAVENOUS; SUBCUTANEOUS at 08:47

## 2023-06-21 RX ADMIN — OXYCODONE HYDROCHLORIDE AND ACETAMINOPHEN 500 MG: 500 TABLET ORAL at 15:18

## 2023-06-21 RX ADMIN — Medication 2000 UNITS: at 08:47

## 2023-06-21 RX ADMIN — EMPAGLIFLOZIN 10 MG: 10 TABLET, FILM COATED ORAL at 15:18

## 2023-06-21 RX ADMIN — RIFAXIMIN 400 MG: 200 TABLET ORAL at 08:46

## 2023-06-21 RX ADMIN — CEFTRIAXONE SODIUM 2000 MG: 10 INJECTION, POWDER, FOR SOLUTION INTRAVENOUS at 15:18

## 2023-06-21 ASSESSMENT — PAIN SCALES - GENERAL: PAINLEVEL_OUTOF10: 0

## 2023-06-21 NOTE — DISCHARGE INSTR - COC
Continuity of Care Form    Patient Name: Aayush Reyes   :    MRN:  [id number]    Admit date:  2023  Discharge date:  23    Code Status Order: Full Code   Advance Directives:     Admitting Physician:  Celi aSntos MD  PCP: Dylan Ny MD    Discharging Nurse: SUE BRUNO [facility] Unit/Room#: 0108/0108-01  Discharging Unit Phone Number: [phone]    Emergency Contact:   Extended Emergency Contact Information  Primary Emergency Contact: Fatmata Diaz  Home Phone: [phone]  Work Phone: [phone]  Mobile Phone: [phone]  Relation: Girlfriend  Secondary Emergency Contact: Branden [address] Phone: [phone]  Mobile Phone: [phone]  Relation: Child    Past Surgical History:  Past Surgical History:   Procedure Laterality Date    FINGER AMPUTATION Left 2020    index finger by circular saw    SHOULDER ARTHROPLASTY Right 2013    TONSILLECTOMY  1964       Immunization History:   Immunization History   Administered Date(s) Administered    COVID-19, PFIZER Bivalent, DO NOT Dilute, (age 12y+), IM, 30 mcg/0.3 mL 2023    COVID-19, PFIZER PURPLE top, DILUTE for use, (age 15 y+), 30mcg/0.3mL 2021, 2021    Influenza Virus Vaccine 10/17/2014, 10/06/2015    Influenza, FLUAD, (age 72 y+), Adjuvanted, 0.5mL 10/21/2020    Influenza, FLUARIX, FLULAVAL, FLUZONE (age 10 mo+) AND AFLURIA, (age 1 y+), PF, 0.5mL 10/03/2016, 10/05/2017, 10/09/2018    Influenza, High Dose (Fluzone 65 yrs and older) 10/09/2019    Pneumococcal, PCV-13, PREVNAR 15, (age 6w+), IM, 0.5mL 10/03/2016, 10/09/2019    Pneumococcal, PPSV23, PNEUMOVAX 21, (age 2y+), SC/IM, 0.5mL 04/10/2018    TDaP, ADACEL (age 10y-63y), BOOSTRIX (age 10y+), IM, 0.5mL 2012, 2020    Zoster Live (Zostavax) 2017       Active Problems:  Patient Active Problem List   Diagnosis Code    Essential hypertension I10    Hyperlipidemia E78.5    Diabetes mellitus type 2 in obese ([facility] [address] E11.69,

## 2023-06-21 NOTE — CONSULTS
[facility] Gastroenterology  Consultation Note     . Chief Complaint:  Generalized body aches  Fatigue  AMS  Urinary frequency    Reason for consult:    Hyperammonemia  Cirrhosis    History of present illness:   Due to patient's mental status at this time HPI slightly limited  This is a 75 y.o. male with PMH including Driscoll cirrhosis, with ascites requiring weekly paracentesis, hypertension, DM type II, hyperlipidemia and GERD who presented to the ER with complaints of generalized body aches fatigue, increase in urination and altered mental status. Patient was found to have a UTI and started on Rocephin. Patient's mental status has been slightly confused and has not improved. So GI was consulted due to acute encephalopathy with underlying cirrhosis. Patient states that he is on transplant list at [facility] [place] [facility] for liver kidney transplant. Patient unable to name his local GI specialist or coordinating physician from [facility] to the [facility]. No records available in [employer] or Care Everywhere  No family at bedside for additional information  Patient denies any drinking smoking or drug use. No NSAIDs    BMP shows sodium of 133, BUN 42, creatinine 1.89, procalcitonin 0.36, venous ammonia this morning was 143, arterial ammonia 106  GI profile shows albumin 2.4, alk phos 247, T. bili 2.3, , lipase 91, INR 1.3  CBC 6/20/2023 shows hemoglobin 10.0, and .3, platelet count 205, iron sat 73%, UIBC 30 TIBC 113,  UA shows dark urine, trace ketones, 1+ mucus culture is pending  Patient had paracentesis yesterday with 9.5 L removed, no SBP noted,     Previous GI history:   No previous records in epic or Care Everywhere  No family present to provide more history    Past Medical/Social/Family History:  History reviewed. No pertinent past medical history.   Past Surgical History:   Procedure Laterality Date    FINGER AMPUTATION Left 06/06/2020    index finger by circular saw    SHOULDER ARTHROPLASTY Right 2013

## 2023-06-22 ENCOUNTER — APPOINTMENT (OUTPATIENT)
Dept: ULTRASOUND IMAGING | Age: 68
End: 2023-06-22
Payer: MEDICARE

## 2023-06-22 LAB
ALBUMIN SERPL-MCNC: 2.2 G/DL (ref 3.5–5.2)
ALBUMIN/GLOB SERPL: 0.6 {RATIO} (ref 1–2.5)
ALP SERPL-CCNC: 239 U/L (ref 40–129)
ALT SERPL-CCNC: 30 U/L (ref 5–41)
AMMONIA PLAS-SCNC: 57 UMOL/L (ref 16–60)
ANION GAP SERPL CALCULATED.3IONS-SCNC: 12 MMOL/L (ref 9–17)
AST SERPL-CCNC: 35 U/L
BASOPHILS # BLD: 0 K/UL (ref 0–0.2)
BASOPHILS NFR BLD: 0 % (ref 0–2)
BILIRUB DIRECT SERPL-MCNC: 1.4 MG/DL
BILIRUB INDIRECT SERPL-MCNC: 1.2 MG/DL (ref 0–1)
BILIRUB SERPL-MCNC: 2.6 MG/DL (ref 0.3–1.2)
BILIRUB UR QL STRIP: ABNORMAL
BUN SERPL-MCNC: 49 MG/DL (ref 8–23)
CALCIUM SERPL-MCNC: 8.9 MG/DL (ref 8.6–10.4)
CASTS #/AREA URNS LPF: NORMAL /LPF (ref 0–8)
CHLORIDE SERPL-SCNC: 101 MMOL/L (ref 98–107)
CLARITY UR: ABNORMAL
CO2 SERPL-SCNC: 19 MMOL/L (ref 20–31)
COLOR UR: ABNORMAL
CREAT SERPL-MCNC: 2.1 MG/DL (ref 0.7–1.2)
EOSINOPHIL # BLD: 0 K/UL (ref 0–0.4)
EOSINOPHILS RELATIVE PERCENT: 0 % (ref 1–4)
EPI CELLS #/AREA URNS HPF: NORMAL /HPF (ref 0–5)
ERYTHROCYTE [DISTWIDTH] IN BLOOD BY AUTOMATED COUNT: 14.1 % (ref 11.8–14.4)
GFR SERPL CREATININE-BSD FRML MDRD: 34 ML/MIN/1.73M2
GLUCOSE BLD-MCNC: 224 MG/DL (ref 75–110)
GLUCOSE BLD-MCNC: 235 MG/DL (ref 75–110)
GLUCOSE BLD-MCNC: 261 MG/DL (ref 75–110)
GLUCOSE BLD-MCNC: 334 MG/DL (ref 75–110)
GLUCOSE SERPL-MCNC: 243 MG/DL (ref 70–99)
GLUCOSE UR STRIP-MCNC: ABNORMAL MG/DL
HCT VFR BLD AUTO: 38.3 % (ref 40.7–50.3)
HGB BLD-MCNC: 12.3 G/DL (ref 13–17)
HGB UR QL STRIP.AUTO: ABNORMAL
IMM GRANULOCYTES # BLD AUTO: 0 K/UL (ref 0–0.3)
IMM GRANULOCYTES NFR BLD: 0 %
INR PPP: 1.3
KETONES UR STRIP-MCNC: NEGATIVE MG/DL
LEUKOCYTE ESTERASE UR QL STRIP: ABNORMAL
LYMPHOCYTES # BLD: 4 % (ref 24–44)
LYMPHOCYTES NFR BLD: 0.7 K/UL (ref 1–4.8)
MCH RBC QN AUTO: 33.2 PG (ref 25.2–33.5)
MCHC RBC AUTO-ENTMCNC: 32.1 G/DL (ref 28.4–34.8)
MCV RBC AUTO: 103.5 FL (ref 82.6–102.9)
MICROORGANISM SPEC CULT: ABNORMAL
MONOCYTES NFR BLD: 0.35 K/UL (ref 0.1–0.8)
MONOCYTES NFR BLD: 2 % (ref 1–7)
MORPHOLOGY: ABNORMAL
NEUTROPHILS NFR BLD: 94 % (ref 36–66)
NEUTS SEG NFR BLD: 16.45 K/UL (ref 1.8–7.7)
NITRITE UR QL STRIP: NEGATIVE
NRBC AUTOMATED: 0 PER 100 WBC
PH UR STRIP: 5.5 [PH] (ref 5–8)
PLATELET # BLD AUTO: 154 K/UL (ref 138–453)
PMV BLD AUTO: 8.8 FL (ref 8.1–13.5)
POTASSIUM SERPL-SCNC: 4.7 MMOL/L (ref 3.7–5.3)
PROT SERPL-MCNC: 6.1 G/DL (ref 6.4–8.3)
PROT UR STRIP-MCNC: ABNORMAL MG/DL
PROTHROMBIN TIME: 16.2 SEC (ref 11.7–14.9)
RBC # BLD AUTO: 3.7 M/UL (ref 4.21–5.77)
RBC #/AREA URNS HPF: NORMAL /HPF (ref 0–4)
SERVICE CMNT-IMP: ABNORMAL
SODIUM SERPL-SCNC: 132 MMOL/L (ref 135–144)
SP GR UR STRIP: 1.03 (ref 1–1.03)
SPECIMEN DESCRIPTION: ABNORMAL
UROBILINOGEN UR STRIP-ACNC: NORMAL
WBC #/AREA URNS HPF: NORMAL /HPF (ref 0–5)
WBC OTHER # BLD: 17.5 K/UL (ref 3.5–11.3)

## 2023-06-22 PROCEDURE — 80048 BASIC METABOLIC PNL TOTAL CA: CPT

## 2023-06-22 PROCEDURE — 82947 ASSAY GLUCOSE BLOOD QUANT: CPT

## 2023-06-22 PROCEDURE — 85610 PROTHROMBIN TIME: CPT

## 2023-06-22 PROCEDURE — 82140 ASSAY OF AMMONIA: CPT

## 2023-06-22 PROCEDURE — 81001 URINALYSIS AUTO W/SCOPE: CPT

## 2023-06-22 PROCEDURE — 6360000002 HC RX W HCPCS: Performed by: INTERNAL MEDICINE

## 2023-06-22 PROCEDURE — 97110 THERAPEUTIC EXERCISES: CPT

## 2023-06-22 PROCEDURE — 6370000000 HC RX 637 (ALT 250 FOR IP): Performed by: INTERNAL MEDICINE

## 2023-06-22 PROCEDURE — 97530 THERAPEUTIC ACTIVITIES: CPT

## 2023-06-22 PROCEDURE — 85027 COMPLETE CBC AUTOMATED: CPT

## 2023-06-22 PROCEDURE — 6370000000 HC RX 637 (ALT 250 FOR IP): Performed by: NURSE PRACTITIONER

## 2023-06-22 PROCEDURE — 1200000000 HC SEMI PRIVATE

## 2023-06-22 PROCEDURE — 76705 ECHO EXAM OF ABDOMEN: CPT

## 2023-06-22 PROCEDURE — 99232 SBSQ HOSP IP/OBS MODERATE 35: CPT | Performed by: INTERNAL MEDICINE

## 2023-06-22 PROCEDURE — 2580000003 HC RX 258: Performed by: INTERNAL MEDICINE

## 2023-06-22 PROCEDURE — 80076 HEPATIC FUNCTION PANEL: CPT

## 2023-06-22 PROCEDURE — 93976 VASCULAR STUDY: CPT

## 2023-06-22 PROCEDURE — 36415 COLL VENOUS BLD VENIPUNCTURE: CPT

## 2023-06-22 RX ORDER — INSULIN GLARGINE 100 [IU]/ML
28 INJECTION, SOLUTION SUBCUTANEOUS NIGHTLY
Status: DISCONTINUED | OUTPATIENT
Start: 2023-06-22 | End: 2023-06-23

## 2023-06-22 RX ORDER — LACTULOSE 10 G/15ML
10 SOLUTION ORAL EVERY 6 HOURS SCHEDULED
Status: DISCONTINUED | OUTPATIENT
Start: 2023-06-23 | End: 2023-06-24 | Stop reason: HOSPADM

## 2023-06-22 RX ORDER — TAMSULOSIN HYDROCHLORIDE 0.4 MG/1
0.4 CAPSULE ORAL DAILY
Status: DISCONTINUED | OUTPATIENT
Start: 2023-06-22 | End: 2023-06-24 | Stop reason: HOSPADM

## 2023-06-22 RX ADMIN — HEPARIN SODIUM 5000 UNITS: 5000 INJECTION INTRAVENOUS; SUBCUTANEOUS at 20:19

## 2023-06-22 RX ADMIN — OXYCODONE HYDROCHLORIDE AND ACETAMINOPHEN 500 MG: 500 TABLET ORAL at 08:14

## 2023-06-22 RX ADMIN — LACTULOSE 30 G: 20 SOLUTION ORAL at 08:13

## 2023-06-22 RX ADMIN — LACTULOSE 30 G: 20 SOLUTION ORAL at 12:05

## 2023-06-22 RX ADMIN — OXYCODONE HYDROCHLORIDE AND ACETAMINOPHEN 500 MG: 500 TABLET ORAL at 20:20

## 2023-06-22 RX ADMIN — TAMSULOSIN HYDROCHLORIDE 0.4 MG: 0.4 CAPSULE ORAL at 12:07

## 2023-06-22 RX ADMIN — INSULIN LISPRO 4 UNITS: 100 INJECTION, SOLUTION INTRAVENOUS; SUBCUTANEOUS at 20:20

## 2023-06-22 RX ADMIN — EMPAGLIFLOZIN 10 MG: 10 TABLET, FILM COATED ORAL at 08:14

## 2023-06-22 RX ADMIN — MIDODRINE HYDROCHLORIDE 2.5 MG: 2.5 TABLET ORAL at 08:14

## 2023-06-22 RX ADMIN — MIDODRINE HYDROCHLORIDE 2.5 MG: 2.5 TABLET ORAL at 20:20

## 2023-06-22 RX ADMIN — LACTULOSE 30 G: 20 SOLUTION ORAL at 04:38

## 2023-06-22 RX ADMIN — THIAMINE HYDROCHLORIDE 100 MG: 100 INJECTION, SOLUTION INTRAMUSCULAR; INTRAVENOUS at 00:13

## 2023-06-22 RX ADMIN — INSULIN LISPRO 4 UNITS: 100 INJECTION, SOLUTION INTRAVENOUS; SUBCUTANEOUS at 17:03

## 2023-06-22 RX ADMIN — SODIUM CHLORIDE: 4.5 INJECTION, SOLUTION INTRAVENOUS at 18:32

## 2023-06-22 RX ADMIN — THIAMINE HYDROCHLORIDE 100 MG: 100 INJECTION, SOLUTION INTRAMUSCULAR; INTRAVENOUS at 23:29

## 2023-06-22 RX ADMIN — RIFAXIMIN 400 MG: 200 TABLET ORAL at 15:20

## 2023-06-22 RX ADMIN — HEPARIN SODIUM 5000 UNITS: 5000 INJECTION INTRAVENOUS; SUBCUTANEOUS at 15:20

## 2023-06-22 RX ADMIN — INSULIN LISPRO 2 UNITS: 100 INJECTION, SOLUTION INTRAVENOUS; SUBCUTANEOUS at 08:13

## 2023-06-22 RX ADMIN — INSULIN GLARGINE 28 UNITS: 100 INJECTION, SOLUTION SUBCUTANEOUS at 20:19

## 2023-06-22 RX ADMIN — SODIUM CHLORIDE, PRESERVATIVE FREE 10 ML: 5 INJECTION INTRAVENOUS at 12:16

## 2023-06-22 RX ADMIN — CEFTRIAXONE SODIUM 2000 MG: 10 INJECTION, POWDER, FOR SOLUTION INTRAVENOUS at 12:23

## 2023-06-22 RX ADMIN — INSULIN LISPRO 2 UNITS: 100 INJECTION, SOLUTION INTRAVENOUS; SUBCUTANEOUS at 12:24

## 2023-06-22 ASSESSMENT — PAIN SCALES - GENERAL: PAINLEVEL_OUTOF10: 0

## 2023-06-22 NOTE — CARE COORDINATION
Patient off unit for testing. CM called and spoke with patient's Larry Crowe to discuss transitional planning. Renetta Herrera states that they are agreeable to [facility] application for patient to return to [facility]. CM called and left message requesting call back for William Yoon with [facility] to verify that they will accept patient and that they will reach out to initiate medicaid application. 1150- Call received from William Yoon with [facility] stating that patient will also need to pay$1,400 balance as well as apply for medicaid to be accepted back. William Yoon states that their business office will be reaching out to patient's family to make arrangements.

## 2023-06-22 NOTE — CONSULTS
Lobo Hwang, [facility] [place]  Urology Consultation      Patient:  Geoff Ruiz  MRN: [id number]  YOB: 1955    CHIEF COMPLAINT:  Urinary Retention    HISTORY OF PRESENT ILLNESS:   The patient is a 75 y.o. male who presents with Altered mental status secondary to liver cirrhosis. He is currently on transplant list at [facility] for liver/kidney transplant. He has had recent issues with urinating and complains of recent nocturia a couple times a night which is new for him. Also has urinary frequency. Patient is unable to tell me if he has a urologist or has been diagnosed with enlarged prostate. On chart review, CT from April 2023 notes enlarged prostate and moderate stool burden. Had BM last night. Per RN at bedside his sorensen catheter was placed yesterday and patient did experience much discomfort with placement. He had 1L output with sorensen placement and dark yellow urine, however urine culture is negative. His creatinine level is around his baseline at 2.1. He underwent paracentesis yesterday for 9.5L. He denies hematuria or history of UTIs. Denies current abdominal and flank pain. Patient's old records, notes and chart reviewed and summarized above. Past Medical History:    History reviewed. No pertinent past medical history.     Past Surgical History:    Past Surgical History:   Procedure Laterality Date    FINGER AMPUTATION Left 06/06/2020    index finger by circular saw    SHOULDER ARTHROPLASTY Right 2013    TONSILLECTOMY  1964       Medications:      Current Facility-Administered Medications:     tamsulosin (FLOMAX) capsule 0.4 mg, 0.4 mg, Oral, Daily, Lorry Dakins, MD    ascorbic acid (VITAMIN C) tablet 500 mg, 500 mg, Oral, BID, Lorry Dakins, MD, 500 mg at 06/22/23 0814    empagliflozin (JARDIANCE) tablet 10 mg, 10 mg, Oral, Daily, Lorry Dakins, MD, 10 mg at 06/22/23 0814    insulin glargine (LANTUS) injection vial 18 Units, 18 Units, SubCUTAneous,

## 2023-06-23 ENCOUNTER — APPOINTMENT (OUTPATIENT)
Dept: ULTRASOUND IMAGING | Age: 68
End: 2023-06-23
Payer: MEDICARE

## 2023-06-23 LAB
ALBUMIN FLD-MCNC: 0.5 G/DL
ALBUMIN SERPL-MCNC: 2.2 G/DL (ref 3.5–5.2)
ALBUMIN/GLOB SERPL: 0.5 {RATIO} (ref 1–2.5)
ALP SERPL-CCNC: 218 U/L (ref 40–129)
ALT SERPL-CCNC: 29 U/L (ref 5–41)
ANION GAP SERPL CALCULATED.3IONS-SCNC: 12 MMOL/L (ref 9–17)
AST SERPL-CCNC: 40 U/L
BASOPHILS # BLD: <0.03 K/UL (ref 0–0.2)
BASOPHILS NFR BLD: 0 % (ref 0–2)
BILIRUB DIRECT SERPL-MCNC: 0.6 MG/DL
BILIRUB INDIRECT SERPL-MCNC: 0.5 MG/DL (ref 0–1)
BILIRUB SERPL-MCNC: 1.1 MG/DL (ref 0.3–1.2)
BODY FLD TYPE: NORMAL
BUN SERPL-MCNC: 55 MG/DL (ref 8–23)
CALCIUM SERPL-MCNC: 8.8 MG/DL (ref 8.6–10.4)
CHLORIDE SERPL-SCNC: 100 MMOL/L (ref 98–107)
CO2 SERPL-SCNC: 19 MMOL/L (ref 20–31)
CREAT SERPL-MCNC: 2.22 MG/DL (ref 0.7–1.2)
EOSINOPHIL # BLD: 0.08 K/UL (ref 0–0.44)
EOSINOPHILS RELATIVE PERCENT: 1 % (ref 1–4)
ERYTHROCYTE [DISTWIDTH] IN BLOOD BY AUTOMATED COUNT: 14.1 % (ref 11.8–14.4)
GFR SERPL CREATININE-BSD FRML MDRD: 31 ML/MIN/1.73M2
GLUCOSE BLD-MCNC: 186 MG/DL (ref 75–110)
GLUCOSE BLD-MCNC: 256 MG/DL (ref 75–110)
GLUCOSE BLD-MCNC: 260 MG/DL (ref 75–110)
GLUCOSE BLD-MCNC: 296 MG/DL (ref 75–110)
GLUCOSE SERPL-MCNC: 197 MG/DL (ref 70–99)
HCT VFR BLD AUTO: 32.7 % (ref 40.7–50.3)
HGB BLD-MCNC: 11 G/DL (ref 13–17)
IMM GRANULOCYTES # BLD AUTO: 0.08 K/UL (ref 0–0.3)
IMM GRANULOCYTES NFR BLD: 1 %
INR PPP: 1.4
LYMPHOCYTES # BLD: 6 % (ref 24–43)
LYMPHOCYTES NFR BLD: 0.77 K/UL (ref 1.1–3.7)
LYMPHOCYTES NFR FLD: 22 %
MCH RBC QN AUTO: 34 PG (ref 25.2–33.5)
MCHC RBC AUTO-ENTMCNC: 33.6 G/DL (ref 28.4–34.8)
MCV RBC AUTO: 100.9 FL (ref 82.6–102.9)
MONOCYTES NFR BLD: 0.69 K/UL (ref 0.1–1.2)
MONOCYTES NFR BLD: 5 % (ref 3–12)
NEUTROPHILS NFR BLD: 87 % (ref 36–65)
NEUTROPHILS NFR FLD: 10 %
NEUTS SEG NFR BLD: 11.15 K/UL (ref 1.5–8.1)
NRBC AUTOMATED: 0 PER 100 WBC
PLATELET # BLD AUTO: 143 K/UL (ref 138–453)
PMV BLD AUTO: 9.4 FL (ref 8.1–13.5)
POTASSIUM SERPL-SCNC: 3.8 MMOL/L (ref 3.7–5.3)
PROT FLD-MCNC: 1 G/DL
PROT SERPL-MCNC: 6.3 G/DL (ref 6.4–8.3)
PROTHROMBIN TIME: 16.7 SEC (ref 11.7–14.9)
RBC # BLD AUTO: 3.24 M/UL (ref 4.21–5.77)
RBC # FLD: <3000 CELLS/UL
SODIUM SERPL-SCNC: 131 MMOL/L (ref 135–144)
SPECIMEN TYPE: NORMAL
SPECIMEN TYPE: NORMAL
UNIDENT CELLS NFR FLD: NORMAL %
WBC # FLD: 104 CELLS/UL
WBC OTHER # BLD: 12.8 K/UL (ref 3.5–11.3)

## 2023-06-23 PROCEDURE — 2580000003 HC RX 258: Performed by: INTERNAL MEDICINE

## 2023-06-23 PROCEDURE — 6370000000 HC RX 637 (ALT 250 FOR IP): Performed by: NURSE PRACTITIONER

## 2023-06-23 PROCEDURE — 99232 SBSQ HOSP IP/OBS MODERATE 35: CPT | Performed by: INTERNAL MEDICINE

## 2023-06-23 PROCEDURE — APPSS30 APP SPLIT SHARED TIME 16-30 MINUTES: Performed by: NURSE PRACTITIONER

## 2023-06-23 PROCEDURE — 6360000002 HC RX W HCPCS: Performed by: INTERNAL MEDICINE

## 2023-06-23 PROCEDURE — 1200000000 HC SEMI PRIVATE

## 2023-06-23 PROCEDURE — 87075 CULTR BACTERIA EXCEPT BLOOD: CPT

## 2023-06-23 PROCEDURE — 84157 ASSAY OF PROTEIN OTHER: CPT

## 2023-06-23 PROCEDURE — 87070 CULTURE OTHR SPECIMN AEROBIC: CPT

## 2023-06-23 PROCEDURE — 87205 SMEAR GRAM STAIN: CPT

## 2023-06-23 PROCEDURE — 2700000000 HC OXYGEN THERAPY PER DAY

## 2023-06-23 PROCEDURE — 82042 OTHER SOURCE ALBUMIN QUAN EA: CPT

## 2023-06-23 PROCEDURE — 85610 PROTHROMBIN TIME: CPT

## 2023-06-23 PROCEDURE — 0W9G3ZZ DRAINAGE OF PERITONEAL CAVITY, PERCUTANEOUS APPROACH: ICD-10-PCS | Performed by: RADIOLOGY

## 2023-06-23 PROCEDURE — 36415 COLL VENOUS BLD VENIPUNCTURE: CPT

## 2023-06-23 PROCEDURE — 6370000000 HC RX 637 (ALT 250 FOR IP): Performed by: INTERNAL MEDICINE

## 2023-06-23 PROCEDURE — 89051 BODY FLUID CELL COUNT: CPT

## 2023-06-23 PROCEDURE — 94761 N-INVAS EAR/PLS OXIMETRY MLT: CPT

## 2023-06-23 PROCEDURE — 85027 COMPLETE CBC AUTOMATED: CPT

## 2023-06-23 PROCEDURE — 80048 BASIC METABOLIC PNL TOTAL CA: CPT

## 2023-06-23 PROCEDURE — 82947 ASSAY GLUCOSE BLOOD QUANT: CPT

## 2023-06-23 PROCEDURE — 2709999900 US GUIDED PARACENTESIS

## 2023-06-23 PROCEDURE — 80076 HEPATIC FUNCTION PANEL: CPT

## 2023-06-23 RX ORDER — MIDODRINE HYDROCHLORIDE 2.5 MG/1
2.5 TABLET ORAL 2 TIMES DAILY
Qty: 90 TABLET | Refills: 3 | DISCHARGE
Start: 2023-06-23

## 2023-06-23 RX ORDER — INSULIN GLARGINE 100 [IU]/ML
35 INJECTION, SOLUTION SUBCUTANEOUS NIGHTLY
Status: DISCONTINUED | OUTPATIENT
Start: 2023-06-23 | End: 2023-06-24 | Stop reason: HOSPADM

## 2023-06-23 RX ORDER — TAMSULOSIN HYDROCHLORIDE 0.4 MG/1
0.4 CAPSULE ORAL DAILY
Qty: 30 CAPSULE | Refills: 3 | DISCHARGE
Start: 2023-06-24

## 2023-06-23 RX ORDER — ONDANSETRON 4 MG/1
4 TABLET, ORALLY DISINTEGRATING ORAL EVERY 8 HOURS PRN
DISCHARGE
Start: 2023-06-23

## 2023-06-23 RX ADMIN — SODIUM CHLORIDE: 4.5 INJECTION, SOLUTION INTRAVENOUS at 13:24

## 2023-06-23 RX ADMIN — LACTULOSE 10 G: 20 SOLUTION ORAL at 20:07

## 2023-06-23 RX ADMIN — PANTOPRAZOLE SODIUM 40 MG: 40 TABLET, DELAYED RELEASE ORAL at 06:22

## 2023-06-23 RX ADMIN — RIFAXIMIN 550 MG: 550 TABLET ORAL at 20:09

## 2023-06-23 RX ADMIN — SODIUM CHLORIDE, PRESERVATIVE FREE 10 ML: 5 INJECTION INTRAVENOUS at 20:10

## 2023-06-23 RX ADMIN — RIFAXIMIN 550 MG: 550 TABLET ORAL at 09:06

## 2023-06-23 RX ADMIN — CEFTRIAXONE SODIUM 2000 MG: 10 INJECTION, POWDER, FOR SOLUTION INTRAVENOUS at 13:55

## 2023-06-23 RX ADMIN — LACTULOSE 10 G: 20 SOLUTION ORAL at 09:05

## 2023-06-23 RX ADMIN — HEPARIN SODIUM 5000 UNITS: 5000 INJECTION INTRAVENOUS; SUBCUTANEOUS at 14:01

## 2023-06-23 RX ADMIN — EMPAGLIFLOZIN 10 MG: 10 TABLET, FILM COATED ORAL at 09:06

## 2023-06-23 RX ADMIN — OXYCODONE HYDROCHLORIDE AND ACETAMINOPHEN 500 MG: 500 TABLET ORAL at 09:06

## 2023-06-23 RX ADMIN — INSULIN GLARGINE 35 UNITS: 100 INJECTION, SOLUTION SUBCUTANEOUS at 21:26

## 2023-06-23 RX ADMIN — OXYCODONE HYDROCHLORIDE AND ACETAMINOPHEN 500 MG: 500 TABLET ORAL at 20:08

## 2023-06-23 RX ADMIN — HEPARIN SODIUM 5000 UNITS: 5000 INJECTION INTRAVENOUS; SUBCUTANEOUS at 21:18

## 2023-06-23 RX ADMIN — HEPARIN SODIUM 5000 UNITS: 5000 INJECTION INTRAVENOUS; SUBCUTANEOUS at 06:22

## 2023-06-23 RX ADMIN — INSULIN LISPRO 4 UNITS: 100 INJECTION, SOLUTION INTRAVENOUS; SUBCUTANEOUS at 12:21

## 2023-06-23 RX ADMIN — INSULIN LISPRO 4 UNITS: 100 INJECTION, SOLUTION INTRAVENOUS; SUBCUTANEOUS at 17:00

## 2023-06-23 RX ADMIN — MIDODRINE HYDROCHLORIDE 2.5 MG: 2.5 TABLET ORAL at 09:06

## 2023-06-23 RX ADMIN — MIDODRINE HYDROCHLORIDE 2.5 MG: 2.5 TABLET ORAL at 20:09

## 2023-06-23 RX ADMIN — TAMSULOSIN HYDROCHLORIDE 0.4 MG: 0.4 CAPSULE ORAL at 09:06

## 2023-06-23 ASSESSMENT — PAIN SCALES - GENERAL
PAINLEVEL_OUTOF10: 0

## 2023-06-23 NOTE — CARE COORDINATION
RIGOBERTO called and spoke with Jacob Gillis from [facility] [phone] and she confirmed that family has paid past due balance and arranged future payment plan. [facility] able to accept patient today or over the weekend when discharged. 1650- Discharge orders placed in [facility]. RIGOBERTO called and spoke with [facility] Gr[facility] and earliest transportation available is 2pm tomorrow. RIGOBERTO called and spoke with Jacob Gillis from [facility] and she confirmed that they are able to accept patient with 2pm transportation tomorrow. CM called and updated patient's [facility] [employer] and she verbalizes being agreeable with plan. KIM and HENS need completed and faxed to [phone].     RN to call report to [phone]

## 2023-06-23 NOTE — BRIEF OP NOTE
Brief Postoperative Note for Paracentesis    Issa Lemon  YOB: 1955  [id number]    Pre-operative Diagnosis:  Ascites     Post-operative Diagnosis: Same    Procedure: Ultrasound guided Paracentesis     Anesthesia: 1% Lidocaine     Surgeons/Assistants: Yazan Morillo PA-C    Complications: none    EBL: Minimal    Specimens: Were obtained    Ultrasound guided paracentesis performed. 4400 ml clear yellow fluid obtained. Dressing applied.      Electronically signed by ANDRIA Baxter on 6/23/2023 at 3:23 PM

## 2023-06-23 NOTE — PLAN OF CARE
Problem: Discharge Planning  Goal: Discharge to home or other facility with appropriate resources  Outcome: Progressing  Flowsheets (Taken 6/22/2023 0800 by Eula Brandon, RN)  Discharge to home or other facility with appropriate resources: Identify barriers to discharge with patient and caregiver     Problem: Safety - Adult  Goal: Free from fall injury  Outcome: Progressing     Problem: Pain  Goal: Verbalizes/displays adequate comfort level or baseline comfort level  Outcome: Progressing     Problem: Chronic Conditions and Co-morbidities  Goal: Patient's chronic conditions and co-morbidity symptoms are monitored and maintained or improved  Outcome: Progressing  Flowsheets (Taken 6/22/2023 0800 by Eula Brandon, RN)  Care Plan - Patient's Chronic Conditions and Co-Morbidity Symptoms are Monitored and Maintained or Improved: Monitor and assess patient's chronic conditions and comorbid symptoms for stability, deterioration, or improvement

## 2023-06-24 VITALS
OXYGEN SATURATION: 94 % | HEIGHT: 72 IN | WEIGHT: 156.53 LBS | BODY MASS INDEX: 21.2 KG/M2 | HEART RATE: 84 BPM | TEMPERATURE: 98.1 F | RESPIRATION RATE: 12 BRPM | DIASTOLIC BLOOD PRESSURE: 67 MMHG | SYSTOLIC BLOOD PRESSURE: 100 MMHG

## 2023-06-24 LAB
GLUCOSE BLD-MCNC: 189 MG/DL (ref 75–110)
MICROORGANISM SPEC CULT: NORMAL
SERVICE CMNT-IMP: NORMAL
SPECIMEN DESCRIPTION: NORMAL

## 2023-06-24 PROCEDURE — 6370000000 HC RX 637 (ALT 250 FOR IP): Performed by: NURSE PRACTITIONER

## 2023-06-24 PROCEDURE — 82947 ASSAY GLUCOSE BLOOD QUANT: CPT

## 2023-06-24 PROCEDURE — 6360000002 HC RX W HCPCS: Performed by: INTERNAL MEDICINE

## 2023-06-24 PROCEDURE — 6370000000 HC RX 637 (ALT 250 FOR IP): Performed by: INTERNAL MEDICINE

## 2023-06-24 PROCEDURE — 99239 HOSP IP/OBS DSCHRG MGMT >30: CPT | Performed by: INTERNAL MEDICINE

## 2023-06-24 PROCEDURE — 2580000003 HC RX 258: Performed by: INTERNAL MEDICINE

## 2023-06-24 RX ORDER — MIDODRINE HYDROCHLORIDE 5 MG/1
5 TABLET ORAL ONCE
Status: COMPLETED | OUTPATIENT
Start: 2023-06-24 | End: 2023-06-24

## 2023-06-24 RX ADMIN — MIDODRINE HYDROCHLORIDE 2.5 MG: 2.5 TABLET ORAL at 09:18

## 2023-06-24 RX ADMIN — RIFAXIMIN 550 MG: 550 TABLET ORAL at 09:18

## 2023-06-24 RX ADMIN — SODIUM CHLORIDE, PRESERVATIVE FREE 10 ML: 5 INJECTION INTRAVENOUS at 09:18

## 2023-06-24 RX ADMIN — THIAMINE HYDROCHLORIDE 100 MG: 100 INJECTION, SOLUTION INTRAMUSCULAR; INTRAVENOUS at 00:13

## 2023-06-24 RX ADMIN — MIDODRINE HYDROCHLORIDE 5 MG: 5 TABLET ORAL at 00:46

## 2023-06-24 RX ADMIN — EMPAGLIFLOZIN 10 MG: 10 TABLET, FILM COATED ORAL at 09:18

## 2023-06-24 RX ADMIN — HEPARIN SODIUM 5000 UNITS: 5000 INJECTION INTRAVENOUS; SUBCUTANEOUS at 06:08

## 2023-06-24 RX ADMIN — PANTOPRAZOLE SODIUM 40 MG: 40 TABLET, DELAYED RELEASE ORAL at 09:18

## 2023-06-24 RX ADMIN — LACTULOSE 10 G: 20 SOLUTION ORAL at 00:13

## 2023-06-24 RX ADMIN — LACTULOSE 10 G: 20 SOLUTION ORAL at 06:08

## 2023-06-24 RX ADMIN — SODIUM CHLORIDE, PRESERVATIVE FREE 10 ML: 5 INJECTION INTRAVENOUS at 09:28

## 2023-06-24 RX ADMIN — OXYCODONE HYDROCHLORIDE AND ACETAMINOPHEN 500 MG: 500 TABLET ORAL at 09:18

## 2023-06-24 NOTE — CARE COORDINATION
Transitional Planning  Spoke with RN, transportation time was changed to 11 am.  [address] [phone], notified patient is being picked up at 11 am and not 2 pm.    Kathyleen Ganser, significant other, notified of transportation time change.       KIM and AMBROCIO faxed     Discharge [facility] Case Management Department  Written by: Rodolfo Olson RN    Patient Name: Aayush Reyes  Attending Provider: Celi Santos MD  Admit Date: 2023  1:32 PM  MRN: [id number]  Account: [de-identified]                     : 1955  Discharge Date:       Disposition: [facility] Sebastian Tony RN

## 2023-06-24 NOTE — DISCHARGE SUMMARY
[facility]  Office: [address], DO, Maya Espinal, DO, Lidia Iraheta, DO, Gerson Weir, DO, Mariah Mei MD, Tia Nicole MD, Lilian Tai MD, Tete Comer MD,  Wanda Alpers, MD, Kirsten Toney MD, Sigifredo Zendejas, DO, Carmen Dean MD,  Mohit Irby MD, Madelyn Odom MD, Cassie Mak, DO, Jason Herrera MD, Celi Osuna MD, Svitlana Downing DO, Farzana Bo MD, Libia Mendez MD, Simeon Aschoff, MD, Kenzie Coronado MD,  Sania Mitchell DO, Kavitha Merino MD,  Clark Ramos CNP,  Darion Martini, CNP, Girma Yip, CNP, Edda Delgado, CNP,  Christopher Martin, [facility], Siria Rinaldi, CNP, Augustine Medina, CNP, Alexia Ohara, CNP, Callie Segovia, CNP, María White, CNP, Yanet Baez PA-C, Mark Ha, CNS, Mandeep Rushing, CNP, Haily Rubin, [facility]    Discharge Summary     Patient ID: Ashley Monroe  :     MRN: [id number]     ACCOUNT:  [de-identified]   Patient's PCP: Krissy Hay MD  Admit Date: 2023   Discharge Date: 2023  Length of Stay: 5  Code Status:  Full Code  Admitting Physician: Geovanna Lilly MD  Discharge Physician: Geovanna Lilly MD     Active Discharge Diagnoses:     Hospital Problem Lists:  Principal Problem:    Encephalopathy acute  Active Problems:    Essential hypertension    Diabetes mellitus type 2 in [facility])    Hepatic encephalopathy ([facility]    Liver cirrhosis secondary to BAUTISTA ([facility]    Abdominal ascites    Stage 3b chronic kidney disease ([facility]    Hyperammonemia ([facility]  Resolved Problems:    * No resolved hospital problems.  *      Admission Condition:  poor     Discharged Condition: fair    Hospital Stay:     Hospital Course:  Ashley Monroe is a 75 y.o. male who was admitted for the management of Encephalopathy acute , presented to ER with Generalized Body Aches, Fatigue, Other (Pt waiting for double transplant, liver and

## 2023-06-24 NOTE — PLAN OF CARE
Problem: Discharge Planning  Goal: Discharge to home or other facility with appropriate resources  Outcome: Progressing     Problem: Safety - Adult  Goal: Free from fall injury  Outcome: Progressing     Problem: Pain  Goal: Verbalizes/displays adequate comfort level or baseline comfort level  Outcome: Progressing     Problem: Chronic Conditions and Co-morbidities  Goal: Patient's chronic conditions and co-morbidity symptoms are monitored and maintained or improved  Outcome: Progressing marin all pertinent systems normal

## 2023-06-24 NOTE — PROGRESS NOTES
4400 MLS OF CLEAR YELLOW FLUID REMOVED VIA PARACENTESIS.     PT TOLERATED WELL  SAMPLE SENT TO LAB PER WRITER
[facility]  Office: [address], DO, Chary Martinez, DO, Marleni Andres, DO, Reginaldo Birmingham Carmella, DO, Raegan Pizano MD, Allison Espitia MD, Africa Aguilar MD, Vern Wilde MD,  Dulce Maria Khan MD, Nelli Espinoza MD, Panchito Goodman, DO, Ana Maria Madrigal MD,  Khadijah Somers MD, Judith Aaron MD, Felicia Parkinson DO, Steven Kelley MD, Raymond Luna MD, Isacc Ceron, DO, Eber Valdez MD, Mary Crowder MD, Barrie Ronquillo MD, Jay Mcelroy MD,  Mohsen Goodrich, DO, Rashid Wells MD,  Demond Prado, CNP,  Madonna Redding, CNP, Broderick Saez, CNP, Claudette Mariscal, CNP,  Marty Bacon, [facility], Melody Carney, CNP, Leon Garcia, CNP, Kaz Vela, CNP, Kenisha Cardona, CNP, Mikhail [facility], CNP, Raine Cash PAGRIS, Jj Bocanegra, CNS, Marita Baez, [facility], Jenny Ho, [address] [place] [address]    Progress Note    6/21/2023    8:01 AM    Name:   hSiva Farrell  MRN:     [id number]     Acct:      [de-identified]   Room:   [address] [place] [address] Day:  2  Admit Date:  6/19/2023  1:32 PM    PCP:   Rosalba Alvarez MD  Code Status:  Full Code    Subjective:     Patient denies any N/V or abdominal pain. He is drowsy still, but oriented. He had  9L removed yesterday in IR. Medications:      Allergies:  No Known Allergies    Current Meds:   Scheduled Meds:    lactulose  20 g Oral 4 times per day    insulin lispro  0-8 Units SubCUTAneous TID WC    insulin lispro  0-4 Units SubCUTAneous Nightly    midodrine  2.5 mg Oral BID    pantoprazole  40 mg Oral QAM AC    rifAXIMin  400 mg Oral TID    sodium chloride flush  5-40 mL IntraVENous 2 times per day    enoxaparin  40 mg SubCUTAneous Daily    sodium chloride flush  5-40 mL IntraVENous 2 times per day    Vitamin D  2,000 Units Oral Weekly    thiamine (VITAMIN B1) IVPB  100 mg IntraVENous Q24H     Continuous Infusions:    dextrose      sodium chloride      sodium chloride
Occupational [address] [employer]  Occupational Therapy Not Seen Note    DATE: 2023    NAME: Shiva Farrell  MRN: [id number]   : 1955      Patient not seen this date for Occupational Therapy due to:    Surgery/Procedure:  Paracentesis      Next Scheduled Treatment:     Electronically signed by AROLDO Abarca on 2023 at 3:52 PM
Occupational [address]  Occupational Therapy Not Seen Note    DATE: 2023    NAME: Jonah Dover  MRN: [id number]   : 1955      Patient not seen this date for Occupational Therapy due to:    Testing: Pt exiting floor for ultrasound    Next Scheduled Treatment:     Electronically signed by AROLDO Castrejon on 2023 at 10:02 AM
Occupational Therapy  Facility/Department: [address] NEURO  Occupational Therapy Initial Assessment    Name: Aayush Reyes  :   MRN: [id number]  Date of Service: 2023    Discharge Recommendations:   Further therapy recommended at discharge. OT Equipment Recommendations  Equipment Needed: Yes  Mobility Devices: ADL Assistive Devices  ADL Assistive Devices: Shower Chair with back       Patient Diagnosis(es): The encounter diagnosis was General weakness. Past Medical History:  has no past medical history on file. Past Surgical History:  has a past surgical history that includes Total shoulder arthroplasty (Right, ); Finger amputation (Left, 2020); and Tonsillectomy (). Assessment   Performance deficits / Impairments: Decreased functional mobility ; Decreased ADL status; Decreased endurance;Decreased high-level IADLs;Decreased ROM; Decreased strength;Decreased balance;Decreased cognition;Decreased safe awareness;Decreased coordination  Assessment: pt demonstrated above deficits impacting occupational performance. pt would benefit from continued OT in order to improve functional independence with ADLs and functional transfers/mobility. pt would be unsafe to return to prior living arrangement without 24/7 d/t decreased awareness of deficits and decreased cognition.   Prognosis: Good  Decision Making: Medium Complexity  REQUIRES OT FOLLOW-UP: Yes  Activity Tolerance  Activity Tolerance: Patient Tolerated treatment well;Treatment limited secondary to decreased cognition;Treatment limited secondary to medical complications (free text)  Activity Tolerance Comments: limited by low BP        Plan   Occupational Therapy Plan  Times Per Week: 3-4x/wk     Restrictions  Restrictions/Precautions  Restrictions/Precautions: Fall Risk  Required Braces or Orthoses?: No  Position Activity Restriction  Other position/activity restrictions: up as tolerated    Subjective   General  Patient assessed for
Patient arrived to room. Time out performed. JR AYERS, RS RDMS to bedside. Site prepped and draped. Access obtained, color yellow, sample collected. 9550ml removed. Sterile dressing applied with no distress noted. Albumin ordered. Report given to charge nurse Jose Ocampo.
Physical Therapy  Facility/Department: [address] NEURO  Physical Therapy daily treatment note  Name: Lucretia Cardona  :   MRN: [id number]  Date of Service: 2023    Discharge Recommendations:  Patient would benefit from continued therapy after discharge   PT Equipment Recommendations  Equipment Needed: No      Patient Diagnosis(es): The encounter diagnosis was General weakness. Past Medical History:  has no past medical history on file. Past Surgical History:  has a past surgical history that includes Total shoulder arthroplasty (Right, ); Finger amputation (Left, 2020); and Tonsillectomy (). Assessment   Body Structures, Functions, Activity Limitations Requiring Skilled Therapeutic Intervention: Decreased functional mobility ; Decreased strength;Decreased posture;Decreased safe awareness;Decreased cognition;Decreased endurance;Decreased balance  Assessment: Pt required MOD A for bed mobility and MIN A sit to stand with RW. Limited by hypotension and fatigue.  Recommend contiuned PT after d/c to address deficits  Therapy Prognosis: Good  Activity Tolerance  Activity Tolerance: Patient limited by fatigue;Patient tolerated treatment well     Plan   Physcial Therapy Plan  General Plan:  (5-6x)  Current Treatment Recommendations: Strengthening, ROM, Balance training, Endurance training, Safety education & training, Patient/Caregiver education & training, Therapeutic activities, Functional mobility training, Transfer training, Gait training, Stair training, Equipment evaluation, education, & procurement, Home exercise program  Safety Devices  Type of Devices: Call light within reach, Left in bed, Bed alarm in place, Gait belt, Nurse notified, Patient at risk for falls  Restraints  Restraints Initially in Place: No     Restrictions  Restrictions/Precautions  Restrictions/Precautions: Fall Risk  Required Braces or Orthoses?: No  Position Activity Restriction  Other position/activity restrictions: up
[facility]  Office: [address], DO, Brie Anderson, DO, Jennifer Farley, DO, Mendoza  Blood, DO, Nehal Mosquera MD, Lore Rossi MD, Samuel Borrero MD, Jesus Macedo MD,  Jose Ayala MD, Kenzie Fischer MD, Nathalia Friend, DO, Alejandro Irving MD,  Tahira Atkinson MD, Vicky Montgomery MD, Dania Barry, DO, Suhail Preciado MD, Stephanie Menendez MD, Quita Frankel, DO, Lucas Nath MD, Karo Goodman MD, Vickie Huber MD, Kacy Holguin MD,  Bhavik Poon, DO, Regine Riley MD,  Silvia Herrera CNP,  Jace Suarez, CNP, Rafa Monsalve, CNP, Idalia Pereyra, CNP,  Jasvir Lebron, [facility], Ruth Gautam, CNP, Jocy Perez CNP, Tj Haq, CNP, Monty Kennedy, CNP, Rubina Maldonado, [facility], Trina Mcgowan, PA-C, Burt Traylor, CNS, Charo Dietrich, CNP, Carlee , [facility]    Progress Note    6/22/2023    3:23 PM    Name:   Julianne Roach  MRN:     [id number]     Acct:      [de-identified]   Room:   [facility] Day:  3  Admit Date:  6/19/2023  1:32 PM    PCP:   Jose J Gregory MD  Code Status:  Full Code    Subjective:     Patient had acute urinary retention yesterday evening. He hadn't urinated all day and had > 999ml on bladder scan. He couldn't urinate in urinal and refused a straight cath. He did let the nurse place a sorensen catheter. He is s/p paracentesis, 9 L removed. He has had 3-4 large loose stools today. Medications:      Allergies:  No Known Allergies    Current Meds:   Scheduled Meds:    tamsulosin  0.4 mg Oral Daily    [START ON 6/23/2023] lactulose  10 g Oral 4 times per day    insulin glargine  28 Units SubCUTAneous Nightly    ascorbic acid  500 mg Oral BID    empagliflozin  10 mg Oral Daily    cefTRIAXone (ROCEPHIN) IV  2,000 mg IntraVENous Q24H    heparin (porcine)  5,000 Units SubCUTAneous 3 times per day    insulin lispro  0-8 Units SubCUTAneous TID WC
Pt left via EMS to SNF. VSS, denies pain. Pt left with all belongings and POA Rafi at bedside. Bedside report called to [address] [place] at [address].
SPIRITUAL CARE DEPARTMENT - [address]  PROGRESS NOTE    Shift date: 6.20.2023  Shift day: Tuesday   Shift # 2    Room # 0108/0108-01   Name: Brian Stone                Islam: unknown   Place of Islam: unknown    Referral: Routine Visit    Admit Date & Time: 6/19/2023  1:32 PM    Assessment:  Brian Stone is a 75 y.o. male in the hospital with family bedside. Upon entering the room writer observes family to be calm and coping. Intervention:  Writer introduced self and title as  Writer offered space for the family  to express feelings, needs, and concerns and provided a ministry presence. Outcome:  Family remains calm and coping. Expressed gratitude for support. Plan:  Chaplains will remain available to offer spiritual and emotional support as needed. Electronically signed by Claudy Cage on 6/20/2023 at 6:44 PM.  [facility]  [phone]       06/20/23 1830   Encounter Summary   Service Provided For: Family   Referral/Consult From: [facility] Family members   Last Encounter  06/20/23   Complexity of Encounter Moderate   Begin Time 1830   End Time  1845   Total Time Calculated 15 min   Encounter    Type Initial Screen/Assessment   Assessment/Intervention/Outcome   Assessment Calm;Coping   Intervention Active listening;Discussed illness injury and its impact; Explored/Affirmed feelings, thoughts, concerns   Outcome Engaged in conversation;Expressed feelings, needs, and concerns       Electronically signed by Aaron Andujar on 6/20/2023 at 6:45 PM
[facility]  Office: [address], DO, Marcia Raw, DO,  Plater, DO, Jose Mobeni Blood, DO, Rodman Goltz, MD, Kari Bee MD, Emil Russo MD, Mirian Lima MD,  Lambert Dexter MD, Joshua Maddox MD, Elis Ramirez, DO, Ernestine Cochran MD,  Sarah Bonilla MD, Isabella Montague MD, Amalia Osorio, DO, Trey Darby MD, Yumiko Block MD, Susan Mclaughlin DO, Britany Fontaine MD, Maykel Lambert MD, Holger Quintero MD, Steven Richard MD,  Vanessa Bustos DO, Izzy Saravia MD,  Román Dumont, [facility],  Mirian Camacho CNP, Tavo Robledo, CNP, Lois Candelaria, CNP,  Angelo Paredes, DNP, Mario Browning, CNP, Yoon López, CNP, Iban Yuan, CNP, Cristóbal Smith, CNP, Stephen Stevens, CNP, Sam Aldrich PA-C, Claire Bhandari, CNS, Kelly Sanabria, [facility], Ivette Brian, [facility]    Progress Note    6/24/2023    7:45 AM    Name:   Jacky Quiroz  MRN:     [id number]     Acct:      [de-identified]   Room:   [facility] Day:  5  Admit Date:  6/19/2023  1:32 PM    PCP:   Denver Pitt, MD  Code Status:  Full Code    Subjective:     Patient is leaving for SNF now. He denies any N/V or abdominal pain. No fevers. Medications:      Allergies:  No Known Allergies    Current Meds:   Scheduled Meds:    rifAXIMin  550 mg Oral BID    insulin glargine  35 Units SubCUTAneous Nightly    tamsulosin  0.4 mg Oral Daily    lactulose  10 g Oral 4 times per day    ascorbic acid  500 mg Oral BID    empagliflozin  10 mg Oral Daily    cefTRIAXone (ROCEPHIN) IV  2,000 mg IntraVENous Q24H    heparin (porcine)  5,000 Units SubCUTAneous 3 times per day    insulin lispro  0-8 Units SubCUTAneous TID WC    insulin lispro  0-4 Units SubCUTAneous Nightly    midodrine  2.5 mg Oral BID    pantoprazole  40 mg Oral QAM AC    sodium chloride flush  5-40 mL IntraVENous 2 times per day    sodium chloride flush  5-40 mL
[facility]  Office: [address], DO, Nicholas Mosquera, DO, Vannatanael Valverde, DO, A[address] Blood, DO, Dalton Trujillo MD, Fadi Cochran MD, Pat Lin MD, Na Pérez MD,  Sharla Lopez MD, Kaleb Teixeira MD, Jb Rutherford, DO, Jayjay Hairston MD,  Sheyla Schulte MD, Serina Blackwood MD, Vern Marie, DO, Elvin Campos MD, Shayne Orosco MD, Tania Sarah, DO, Milagros Yip MD, Raji Sprague MD, Theron Gale MD, Carina Schmid MD,  Ellen Hodgkin, DO, Deepak Walsh MD,  Aaron Vo CNP,  Beth Chandler, CNP, Jag Ferguson, CNP, Lali Dc, CNP,  Ruth Harris, [facility], Mann Sarah, CNP, Blayne Strange, CNP, Sally Nicole, CNP, Annia Doran, CNP, Chacha Perez, CNP, Rosemary Cooper PA-C, Marlon Sofia, CNS, Leoncio Guerra, CNP, Billie Aschoff, [facility]    Progress Note    6/20/2023    8:06 AM    Name:   Mendoza Pacheco  MRN:     [id number]     Acct:      [de-identified]   Room:   [facility] Day:  1  Admit Date:  6/19/2023  1:32 PM    PCP:   Lucas Kruse MD  Code Status:  Full Code    Subjective:     Patient just returned from  for paracentesis. He had > 9 L removed. He feels more comfortable. He denies any N/V or abdominal pain. He is drowsy. Medications:      Allergies:  No Known Allergies    Current Meds:   Scheduled Meds:    insulin lispro  0-8 Units SubCUTAneous TID WC    insulin lispro  0-4 Units SubCUTAneous Nightly    lactulose  10 g Oral TID    midodrine  2.5 mg Oral BID    pantoprazole  40 mg Oral QAM AC    rifAXIMin  400 mg Oral TID    sodium chloride flush  5-40 mL IntraVENous 2 times per day    enoxaparin  40 mg SubCUTAneous Daily    sodium chloride flush  5-40 mL IntraVENous 2 times per day    [START ON 6/21/2023] Vitamin D  2,000 Units Oral Weekly    thiamine (VITAMIN B1) IVPB  100 mg IntraVENous Q24H     Continuous Infusions:    dextrose
To IR for paracentesis via stretcher.
HEPCAB, HEPBIGM, HEPAIGM    HCV Genotype:  No results found for: HEPATITISCGENOTYPE    HCV Quantitative:  No results found for: HCVQNT    LIVER WORK UP:    AFP  No results found for: AFP    Alpha 1 antitrypsin   No results found for: A1A    JENNIFER  No results found for: JENNIFER    AMA  No results found for: MITOAB    ASMA  No results found for: SMOOTHMUSCAB    PT/INR  Recent Labs     06/21/23  1240 06/22/23  0639   PROTIME 16.4* 16.2*   INR 1.3 1.3       Cancer Markers:  CEA:  No results for input(s): CEA in the last 72 hours. Ca 125:  No results for input(s):  in the last 72 hours. Ca 19-9:   Invalid input(s):   AFP: No results for input(s): AFP in the last 72 hours. Lactic acid:Invalid input(s): LACTIC ACID    Radiology Review:    US LIVER    Result Date: 6/22/2023  EXAMINATION: RIGHT UPPER QUADRANT ULTRASOUND WITH LIVER DOPPLER 6/22/2023 10:07 am COMPARISON: None. HISTORY: ORDERING SYSTEM PROVIDED HISTORY: Please rule out [address] portal vein thrombosis, or [address] TECHNOLOGIST PROVIDED HISTORY: Include Doppler of Hepatic-Portal-Splenic Veins Please rule out [address] portal vein thrombosis, or HCC FINDINGS: LIVER:  The liver is shrunken in size, nodular in contour, and coarsened in echotexture, all consistent with cirrhosis. However, no discrete intrahepatic nodule or mass is identified. There is no evidence of intrahepatic biliary ductal dilatation. Liver Doppler evaluation demonstrates that the portal vein is patent, and demonstrates normal hepatopetal flow. The intrahepatic portal veins are also patent, and demonstrate normal directional flow. The hepatic veins and hepatic arteries are patent and demonstrate normal directional flow. The IVC is patent. The splenic vein is patent and demonstrates normal directional flow. BILIARY SYSTEM:  The gallbladder is partially contracted, though contains gallstones, with pseudo wall thickening evident, likely secondary to partial contraction as well as underlying ascites.
General appearance:  alert, cooperative and no distress  Mental Status:  oriented to person, place and time and flat affect  Lungs:  clear to auscultation bilaterally, normal effort  Heart:  regular rate and rhythm, no murmur  Abdomen:  soft, nontender, less distended, normal bowel sounds, no masses, + mild ascites  Extremities:  no edema, redness, tenderness in the calves  Skin:  no gross lesions, rashes, induration    Assessment:     Hospital Problems             Last Modified POA    * (Principal) Encephalopathy acute 6/19/2023 Yes    Essential hypertension 6/21/2023 Yes    Diabetes mellitus type 2 in obese ([address] 6/20/2023 Yes    Hepatic encephalopathy ([address] 6/20/2023 Yes    Liver cirrhosis secondary to BAUTISTA ([address] 6/20/2023 Yes    Abdominal ascites 6/20/2023 Yes    Stage 3b chronic kidney disease ([address] 6/21/2023 Yes    Hyperammonemia ([address] 6/20/2023 Yes       Plan:     Acute encephalopathy- resolved, CT brain negative, may be from hepatic encephalopathy, hyperammonia-  resumed Lactulose at lower dose. Ammonia improved, GI following  CKD 3-creatinine near baseline, avoid nephrotoxic agents,con't Farxiga  Acute Urinary retention- sorensen placed, start Flomax,   Urology eval noted  Liver cirrhosis secondary to BAUTISTA-continue rifaximin, with weekly paracentesis, patient awaiting liver [facility], Liver US reviewed  Anemia- likely from chronic disease  Hypertension- BP stable, low normal with Midodrine, hold BB and Vasotec  DM2- increase Lantus 35 units qhs, SSI, hold Metformin and Amaryl,  HbA1C 6.6                     GERD  DVT prophylaxis  PT/OT- increase ambulating, up to chair    Possible DC planning to SNF when able. GI signed off , okay to transfer to Med surg, Jj signed, med rec in progress.   Stable for discharge    Medical Decision Making: Kristi Pickett MD  6/23/2023  8:45 AM
hypotension  Ambulation  Comments: unable to safely attempt due to hypotension  More Ambulation?: No  Stairs/Curb  Stairs?: No     Balance  Posture: Fair  Sitting - Static: Good;-  Sitting - Dynamic: Fair;+  Comments: unable to attempt standing balance due to hypotension; pt able to sit EOB CGA once established    AM-PAC Score  AM-PAC Inpatient Mobility Raw Score : 6 (06/21/23 1338)  AM-PAC Inpatient T-Scale Score : 23.55 (06/21/23 1338)  Mobility Inpatient CMS 0-100% Score: 100 (06/21/23 1338)  Mobility Inpatient CMS G-Code Modifier : CN (06/21/23 1338)  Goals  Short Term Goals  Time Frame for Short Term Goals: 14  Short Term Goal 1: Pt to perform bed mobility Rhea  Short Term Goal 2: Pt to demonstrate functional transfers Rhea  Short Term Goal 3: Ambulate 50ft with least restrictive AD Rhea  Short Term Goal 4: Tolerate 30 minutes of therapy to demo increased endurance  Patient Goals   Patient Goals :  To go home       Education  Patient Education  Education Given To: Patient  Education Provided: Role of Therapy;Plan of Care  Education Method: Demonstration  Barriers to Learning: Cognition  Education Outcome: Continued education needed      Therapy Time   Individual Concurrent Group Co-treatment   Time In 3788         Time Out 1056         Minutes 15         Timed Code Treatment Minutes: 8 Minutes       Jacob Kline, PT

## 2023-06-25 LAB
MICROORGANISM SPEC CULT: ABNORMAL
MICROORGANISM SPEC CULT: NORMAL
MICROORGANISM/AGENT SPEC: ABNORMAL
MICROORGANISM/AGENT SPEC: NORMAL
SERVICE CMNT-IMP: NORMAL
SPECIMEN DESCRIPTION: ABNORMAL
SPECIMEN DESCRIPTION: NORMAL

## 2023-06-26 LAB
BODY FLD TYPE: NORMAL
LYMPHOCYTES NFR FLD: 22 %
MICROORGANISM SPEC CULT: NORMAL
MICROORGANISM/AGENT SPEC: NORMAL
NEUTROPHILS NFR FLD: 10 %
RBC # FLD: <3000 CELLS/UL
SERVICE CMNT-IMP: NORMAL
SPECIMEN DESCRIPTION: NORMAL
UNIDENT CELLS NFR FLD: NORMAL %
WBC # FLD: 104 CELLS/UL

## 2023-06-29 LAB
MICROORGANISM SPEC CULT: ABNORMAL
MICROORGANISM/AGENT SPEC: ABNORMAL
SPECIMEN DESCRIPTION: ABNORMAL